# Patient Record
Sex: MALE | Race: WHITE | NOT HISPANIC OR LATINO | URBAN - METROPOLITAN AREA
[De-identification: names, ages, dates, MRNs, and addresses within clinical notes are randomized per-mention and may not be internally consistent; named-entity substitution may affect disease eponyms.]

---

## 2017-01-03 ENCOUNTER — COMMUNICATION - HEALTHEAST (OUTPATIENT)
Dept: FAMILY MEDICINE | Facility: CLINIC | Age: 77
End: 2017-01-03

## 2017-01-05 ENCOUNTER — OFFICE VISIT - HEALTHEAST (OUTPATIENT)
Dept: FAMILY MEDICINE | Facility: CLINIC | Age: 77
End: 2017-01-05

## 2017-01-05 DIAGNOSIS — G47.00 INSOMNIA, UNSPECIFIED: ICD-10-CM

## 2017-01-05 DIAGNOSIS — I48.0 PAROXYSMAL ATRIAL FIBRILLATION (H): ICD-10-CM

## 2017-01-05 DIAGNOSIS — H10.9 BILATERAL CONJUNCTIVITIS: ICD-10-CM

## 2017-01-09 ENCOUNTER — COMMUNICATION - HEALTHEAST (OUTPATIENT)
Dept: CARDIOLOGY | Facility: CLINIC | Age: 77
End: 2017-01-09

## 2017-01-09 DIAGNOSIS — I48.19 PERSISTENT ATRIAL FIBRILLATION (H): ICD-10-CM

## 2017-01-09 DIAGNOSIS — I48.91 A-FIB (H): ICD-10-CM

## 2017-01-09 DIAGNOSIS — I42.9 CARDIOMYOPATHY (H): ICD-10-CM

## 2017-02-16 ENCOUNTER — COMMUNICATION - HEALTHEAST (OUTPATIENT)
Dept: FAMILY MEDICINE | Facility: CLINIC | Age: 77
End: 2017-02-16

## 2017-02-21 ENCOUNTER — COMMUNICATION - HEALTHEAST (OUTPATIENT)
Dept: ADMINISTRATIVE | Facility: CLINIC | Age: 77
End: 2017-02-21

## 2017-03-22 ENCOUNTER — AMBULATORY - HEALTHEAST (OUTPATIENT)
Dept: CARDIOLOGY | Facility: CLINIC | Age: 77
End: 2017-03-22

## 2017-03-22 ENCOUNTER — COMMUNICATION - HEALTHEAST (OUTPATIENT)
Dept: CARDIOLOGY | Facility: CLINIC | Age: 77
End: 2017-03-22

## 2017-03-22 DIAGNOSIS — Z95.810 ICD (IMPLANTABLE CARDIOVERTER-DEFIBRILLATOR) IN PLACE: ICD-10-CM

## 2017-04-12 ENCOUNTER — AMBULATORY - HEALTHEAST (OUTPATIENT)
Dept: CARDIOLOGY | Facility: CLINIC | Age: 77
End: 2017-04-12

## 2017-04-12 DIAGNOSIS — I48.0 PAROXYSMAL ATRIAL FIBRILLATION (H): ICD-10-CM

## 2017-04-12 DIAGNOSIS — Z95.810 PRESENCE OF CARDIAC RESYNCHRONIZATION THERAPY DEFIBRILLATOR: ICD-10-CM

## 2017-04-12 DIAGNOSIS — I42.8 NONISCHEMIC CARDIOMYOPATHY (H): ICD-10-CM

## 2017-04-12 ASSESSMENT — MIFFLIN-ST. JEOR: SCORE: 1657.68

## 2017-04-26 ENCOUNTER — COMMUNICATION - HEALTHEAST (OUTPATIENT)
Dept: FAMILY MEDICINE | Facility: CLINIC | Age: 77
End: 2017-04-26

## 2017-04-26 DIAGNOSIS — E03.9 HYPOTHYROID: ICD-10-CM

## 2017-05-15 ENCOUNTER — COMMUNICATION - HEALTHEAST (OUTPATIENT)
Dept: FAMILY MEDICINE | Facility: CLINIC | Age: 77
End: 2017-05-15

## 2017-05-24 ENCOUNTER — AMBULATORY - HEALTHEAST (OUTPATIENT)
Dept: CARDIOLOGY | Facility: CLINIC | Age: 77
End: 2017-05-24

## 2017-05-24 DIAGNOSIS — Z95.810 ICD (IMPLANTABLE CARDIOVERTER-DEFIBRILLATOR) IN PLACE: ICD-10-CM

## 2017-05-25 ENCOUNTER — COMMUNICATION - HEALTHEAST (OUTPATIENT)
Dept: CARDIOLOGY | Facility: CLINIC | Age: 77
End: 2017-05-25

## 2017-05-25 DIAGNOSIS — I48.91 ATRIAL FIBRILLATION (H): ICD-10-CM

## 2017-05-25 DIAGNOSIS — I48.20 CHRONIC ATRIAL FIBRILLATION (H): ICD-10-CM

## 2017-05-25 LAB — HCC DEVICE COMMENTS: NORMAL

## 2017-05-30 ENCOUNTER — AMBULATORY - HEALTHEAST (OUTPATIENT)
Dept: CARDIOLOGY | Facility: CLINIC | Age: 77
End: 2017-05-30

## 2017-05-30 DIAGNOSIS — Z95.810 ICD (IMPLANTABLE CARDIOVERTER-DEFIBRILLATOR) IN PLACE: ICD-10-CM

## 2017-05-30 LAB — HCC DEVICE COMMENTS: NORMAL

## 2017-07-07 ENCOUNTER — OFFICE VISIT - HEALTHEAST (OUTPATIENT)
Dept: FAMILY MEDICINE | Facility: CLINIC | Age: 77
End: 2017-07-07

## 2017-07-07 DIAGNOSIS — I48.0 PAROXYSMAL ATRIAL FIBRILLATION (H): ICD-10-CM

## 2017-07-07 DIAGNOSIS — L30.9 DERMATITIS: ICD-10-CM

## 2017-07-07 DIAGNOSIS — D64.9 NORMOCHROMIC NORMOCYTIC ANEMIA: ICD-10-CM

## 2017-07-07 DIAGNOSIS — I42.8 NONISCHEMIC CARDIOMYOPATHY (H): ICD-10-CM

## 2017-07-07 DIAGNOSIS — E03.9 HYPOTHYROIDISM, UNSPECIFIED TYPE: ICD-10-CM

## 2017-07-07 DIAGNOSIS — N18.3 CHRONIC KIDNEY DISEASE (CKD), STAGE 3 (MODERATE): ICD-10-CM

## 2017-07-07 LAB
CHOLEST SERPL-MCNC: 221 MG/DL
FASTING STATUS PATIENT QL REPORTED: YES
HDLC SERPL-MCNC: 51 MG/DL
LDLC SERPL CALC-MCNC: 141 MG/DL
TRIGL SERPL-MCNC: 147 MG/DL

## 2017-07-12 ENCOUNTER — AMBULATORY - HEALTHEAST (OUTPATIENT)
Dept: CARDIOLOGY | Facility: CLINIC | Age: 77
End: 2017-07-12

## 2017-07-12 DIAGNOSIS — Z95.810 ICD (IMPLANTABLE CARDIOVERTER-DEFIBRILLATOR) IN PLACE: ICD-10-CM

## 2017-07-12 LAB — HCC DEVICE COMMENTS: NORMAL

## 2017-08-11 ENCOUNTER — COMMUNICATION - HEALTHEAST (OUTPATIENT)
Dept: FAMILY MEDICINE | Facility: CLINIC | Age: 77
End: 2017-08-11

## 2017-09-07 ENCOUNTER — COMMUNICATION - HEALTHEAST (OUTPATIENT)
Dept: CARDIOLOGY | Facility: CLINIC | Age: 77
End: 2017-09-07

## 2017-09-07 DIAGNOSIS — I10 HYPERTENSION: ICD-10-CM

## 2017-09-08 ENCOUNTER — COMMUNICATION - HEALTHEAST (OUTPATIENT)
Dept: FAMILY MEDICINE | Facility: CLINIC | Age: 77
End: 2017-09-08

## 2017-09-08 ENCOUNTER — AMBULATORY - HEALTHEAST (OUTPATIENT)
Dept: FAMILY MEDICINE | Facility: CLINIC | Age: 77
End: 2017-09-08

## 2017-09-08 DIAGNOSIS — H10.9 BILATERAL CONJUNCTIVITIS: ICD-10-CM

## 2017-10-17 ENCOUNTER — COMMUNICATION - HEALTHEAST (OUTPATIENT)
Dept: FAMILY MEDICINE | Facility: CLINIC | Age: 77
End: 2017-10-17

## 2017-10-17 DIAGNOSIS — E03.9 HYPOTHYROID: ICD-10-CM

## 2017-10-18 ENCOUNTER — AMBULATORY - HEALTHEAST (OUTPATIENT)
Dept: CARDIOLOGY | Facility: CLINIC | Age: 77
End: 2017-10-18

## 2017-10-18 DIAGNOSIS — Z95.810 ICD (IMPLANTABLE CARDIOVERTER-DEFIBRILLATOR) IN PLACE: ICD-10-CM

## 2017-10-18 LAB — HCC DEVICE COMMENTS: NORMAL

## 2017-11-09 ENCOUNTER — COMMUNICATION - HEALTHEAST (OUTPATIENT)
Dept: FAMILY MEDICINE | Facility: CLINIC | Age: 77
End: 2017-11-09

## 2018-01-04 ENCOUNTER — COMMUNICATION - HEALTHEAST (OUTPATIENT)
Dept: CARDIOLOGY | Facility: CLINIC | Age: 78
End: 2018-01-04

## 2018-01-04 DIAGNOSIS — I48.91 ATRIAL FIBRILLATION, UNSPECIFIED TYPE (H): ICD-10-CM

## 2018-01-04 DIAGNOSIS — I48.91 A-FIB (H): ICD-10-CM

## 2018-01-24 ENCOUNTER — COMMUNICATION - HEALTHEAST (OUTPATIENT)
Dept: CARDIOLOGY | Facility: CLINIC | Age: 78
End: 2018-01-24

## 2018-01-24 DIAGNOSIS — I42.9 CARDIOMYOPATHY (H): ICD-10-CM

## 2018-01-24 DIAGNOSIS — I48.19 PERSISTENT ATRIAL FIBRILLATION (H): ICD-10-CM

## 2018-01-29 ENCOUNTER — AMBULATORY - HEALTHEAST (OUTPATIENT)
Dept: CARDIOLOGY | Facility: CLINIC | Age: 78
End: 2018-01-29

## 2018-01-29 DIAGNOSIS — Z95.810 ICD (IMPLANTABLE CARDIOVERTER-DEFIBRILLATOR) IN PLACE: ICD-10-CM

## 2018-01-30 LAB — HCC DEVICE COMMENTS: NORMAL

## 2018-02-07 ENCOUNTER — COMMUNICATION - HEALTHEAST (OUTPATIENT)
Dept: FAMILY MEDICINE | Facility: CLINIC | Age: 78
End: 2018-02-07

## 2018-02-19 ENCOUNTER — COMMUNICATION - HEALTHEAST (OUTPATIENT)
Dept: CARDIOLOGY | Facility: CLINIC | Age: 78
End: 2018-02-19

## 2018-02-19 DIAGNOSIS — I48.20 CHRONIC ATRIAL FIBRILLATION (H): ICD-10-CM

## 2018-02-24 ENCOUNTER — COMMUNICATION - HEALTHEAST (OUTPATIENT)
Dept: CARDIOLOGY | Facility: CLINIC | Age: 78
End: 2018-02-24

## 2018-02-24 DIAGNOSIS — I42.9 CARDIOMYOPATHY (H): ICD-10-CM

## 2018-02-24 DIAGNOSIS — I48.19 PERSISTENT ATRIAL FIBRILLATION (H): ICD-10-CM

## 2018-03-09 ENCOUNTER — COMMUNICATION - HEALTHEAST (OUTPATIENT)
Dept: FAMILY MEDICINE | Facility: CLINIC | Age: 78
End: 2018-03-09

## 2018-03-09 DIAGNOSIS — E03.9 HYPOTHYROIDISM: ICD-10-CM

## 2018-03-12 ENCOUNTER — AMBULATORY - HEALTHEAST (OUTPATIENT)
Dept: LAB | Facility: CLINIC | Age: 78
End: 2018-03-12

## 2018-03-12 DIAGNOSIS — E03.9 HYPOTHYROIDISM: ICD-10-CM

## 2018-03-12 LAB — TSH SERPL DL<=0.005 MIU/L-ACNC: 4.44 UIU/ML (ref 0.3–5)

## 2018-04-09 ENCOUNTER — AMBULATORY - HEALTHEAST (OUTPATIENT)
Dept: CARDIOLOGY | Facility: CLINIC | Age: 78
End: 2018-04-09

## 2018-04-09 DIAGNOSIS — Z95.810 PRESENCE OF CARDIAC RESYNCHRONIZATION THERAPY DEFIBRILLATOR: ICD-10-CM

## 2018-04-09 DIAGNOSIS — I48.19 PERSISTENT ATRIAL FIBRILLATION (H): ICD-10-CM

## 2018-04-09 DIAGNOSIS — I48.0 PAROXYSMAL ATRIAL FIBRILLATION (H): ICD-10-CM

## 2018-04-09 DIAGNOSIS — I42.9 CARDIOMYOPATHY, UNSPECIFIED TYPE (H): ICD-10-CM

## 2018-04-09 LAB
ATRIAL RATE - MUSE: 101 BPM
DIASTOLIC BLOOD PRESSURE - MUSE: NORMAL MMHG
HCC DEVICE COMMENTS: NORMAL
INTERPRETATION ECG - MUSE: NORMAL
P AXIS - MUSE: NORMAL DEGREES
PR INTERVAL - MUSE: NORMAL MS
QRS DURATION - MUSE: 172 MS
QT - MUSE: 448 MS
QTC - MUSE: 516 MS
R AXIS - MUSE: 233 DEGREES
SYSTOLIC BLOOD PRESSURE - MUSE: NORMAL MMHG
T AXIS - MUSE: 46 DEGREES
VENTRICULAR RATE- MUSE: 80 BPM

## 2018-04-09 ASSESSMENT — MIFFLIN-ST. JEOR: SCORE: 1656.32

## 2018-04-14 ENCOUNTER — COMMUNICATION - HEALTHEAST (OUTPATIENT)
Dept: CARDIOLOGY | Facility: CLINIC | Age: 78
End: 2018-04-14

## 2018-04-14 ENCOUNTER — AMBULATORY - HEALTHEAST (OUTPATIENT)
Dept: CARDIOLOGY | Facility: CLINIC | Age: 78
End: 2018-04-14

## 2018-05-05 ENCOUNTER — COMMUNICATION - HEALTHEAST (OUTPATIENT)
Dept: FAMILY MEDICINE | Facility: CLINIC | Age: 78
End: 2018-05-05

## 2018-06-05 ENCOUNTER — COMMUNICATION - HEALTHEAST (OUTPATIENT)
Dept: CARDIOLOGY | Facility: CLINIC | Age: 78
End: 2018-06-05

## 2018-06-05 DIAGNOSIS — I10 HYPERTENSION: ICD-10-CM

## 2018-07-03 ENCOUNTER — AMBULATORY - HEALTHEAST (OUTPATIENT)
Dept: CARDIOLOGY | Facility: CLINIC | Age: 78
End: 2018-07-03

## 2018-07-03 DIAGNOSIS — Z95.810 ICD (IMPLANTABLE CARDIOVERTER-DEFIBRILLATOR) IN PLACE: ICD-10-CM

## 2018-07-05 LAB
HCC DEVICE COMMENTS: NORMAL
HCC DEVICE IMPLANTING PROVIDER: NORMAL
HCC DEVICE MANUFACTURE: NORMAL
HCC DEVICE MODEL: NORMAL
HCC DEVICE SERIAL NUMBER: NORMAL
HCC DEVICE TYPE: NORMAL

## 2018-07-18 ENCOUNTER — COMMUNICATION - HEALTHEAST (OUTPATIENT)
Dept: FAMILY MEDICINE | Facility: CLINIC | Age: 78
End: 2018-07-18

## 2018-07-18 DIAGNOSIS — E03.9 HYPOTHYROID: ICD-10-CM

## 2018-08-06 ENCOUNTER — COMMUNICATION - HEALTHEAST (OUTPATIENT)
Dept: FAMILY MEDICINE | Facility: CLINIC | Age: 78
End: 2018-08-06

## 2018-08-06 DIAGNOSIS — G47.00 INSOMNIA: ICD-10-CM

## 2018-08-15 ENCOUNTER — COMMUNICATION - HEALTHEAST (OUTPATIENT)
Dept: SCHEDULING | Facility: CLINIC | Age: 78
End: 2018-08-15

## 2018-09-26 ENCOUNTER — COMMUNICATION - HEALTHEAST (OUTPATIENT)
Dept: FAMILY MEDICINE | Facility: CLINIC | Age: 78
End: 2018-09-26

## 2018-09-27 ENCOUNTER — COMMUNICATION - HEALTHEAST (OUTPATIENT)
Dept: SCHEDULING | Facility: CLINIC | Age: 78
End: 2018-09-27

## 2018-09-27 ENCOUNTER — PATIENT OUTREACH (OUTPATIENT)
Dept: CARE COORDINATION | Facility: CLINIC | Age: 78
End: 2018-09-27

## 2018-09-27 ASSESSMENT — ACTIVITIES OF DAILY LIVING (ADL): DEPENDENT_IADLS:: INDEPENDENT

## 2018-09-27 NOTE — PROGRESS NOTES
"Clinic Care Coordination Contact    Clinic Care Coordination Contact  OUTREACH    Referral Information:  Referral Source: ED Follow-Up    Primary Diagnosis: (neuropathy)    Chief Complaint   Patient presents with     Clinic Care Coordination - Post Hospital     DC\"d on 9/26 from [facility]        Universal Utilization: Appropriate utilization.  Clinic Utilization  Difficulty keeping appointments:: No  Compliance Concerns: No  No-Show Concerns: No  No PCP office visit in Past Year: No            Clinical Concerns:  Current Medical Concerns:  Glad he went to ED yesterday to get a CAT scan to make sure he wasn't having a blood clot due to his A Fib. He just finished playing a round of golf.   Patient will f/u with PCP to review symptoms and discuss treatment for neuropathy.  Feeling well today.   Current Behavioral Concerns: no concerns noted.    Education Provided to patient: Reviewed care coordination role.     Health Maintenance Reviewed: Due/Overdue     Medication Management:  Is independent with his medications and has no concerns.     Functional Status:  Dependent ADLs:: Independent, Ambulation-no assistive device  Dependent IADLs:: Independent  Bed or wheelchair confined:: No  Mobility Status: Independent  Fallen 2 or more times in the past year?: No  Any fall with injury in the past year?: No    Living Situation:  Current living arrangement:: I live alone, I live in a private home  Type of residence:: Private home - stairs    Diet/Exercise/Sleep:  Diet:: Regular  Inadequate nutrition (GOAL):: No  Food Insecurity: No  Tube Feeding: No  Exercise:: Yes  Inadequate activity/exercise (GOAL):: No  Significant changes in sleep pattern (GOAL): No    Transportation:  Transportation concerns (GOAL):: No  Transportation means:: Regular car     Psychosocial:  Adventism or spiritual beliefs that impact treatment:: No  Mental health DX:: No  Mental health management concern (GOAL):: No  Informal Support system:: " Friends     Financial/Insurance:   Financial/Insurance concerns (GOAL):: No  [employer] insurance.  Works part time.      Resources and Interventions:  Current Resources:      Community Resources: None  Supplies used at home:: None  Equipment Currently Used at Home: none    Advance Care Plan/Directive  Advanced Care Plans/Directives on file:: No  Type Advanced Care Plans/Directives: Advanced Directive - has one partially done, but has not finished it.  Been working on it for two years. Offered to send him the shortened form for HCD and he is interested in completing that.   Advanced Care Plan/Directive Status: In Process    Referrals Placed: None    Patient/Caregiver understanding: Patient does not want to engage with care coordinator at this time. He will set up OV with PCP. CC will send letter with CC contact information and a HCD.      Plan: No further outreach by this CC at this time.    Ernestina Villafuerte,   [facility]  [email or web address]  [phone]

## 2018-09-27 NOTE — LETTER
[place] CARE COORDINATION  [facility] [address]  [facility] [address]    September 28, 2018    Glenn Caballero        Dear Glenn,    I am a clinic care coordinator who works with Collin Blanco at [phone]. I wanted to thank you for spending the time to talk with me.  I wanted to introduce myself and provide you with my contact information so that you can call me with questions or concerns about your health care. Below is a description of clinic care coordination and how I can further assist you.     The clinic care coordinator is a registered nurse and/or  who understand the health care system. The goal of clinic care coordination is to help you manage your health and improve access to the [facility] system in the most efficient manner. The registered nurse can assist you in meeting your health care goals by providing education, coordinating services, and strengthening the communication among your providers. The  can assist you with financial, behavioral, psychosocial, chemical dependency, counseling, and/or psychiatric resources.    Please feel free to contact me at [phone], with any questions or concerns. We at [facility] are focused on providing you with the highest-quality healthcare experience possible and that all starts with you.     Sincerely,     Ernestina Villafuerte    Enclosed:health care directive

## 2018-11-03 ENCOUNTER — COMMUNICATION - HEALTHEAST (OUTPATIENT)
Dept: FAMILY MEDICINE | Facility: CLINIC | Age: 78
End: 2018-11-03

## 2018-11-03 DIAGNOSIS — G47.00 INSOMNIA: ICD-10-CM

## 2018-11-07 ENCOUNTER — OFFICE VISIT - HEALTHEAST (OUTPATIENT)
Dept: FAMILY MEDICINE | Facility: CLINIC | Age: 78
End: 2018-11-07

## 2018-11-07 DIAGNOSIS — E03.9 HYPOTHYROIDISM, UNSPECIFIED TYPE: ICD-10-CM

## 2018-11-07 DIAGNOSIS — R20.2 PARESTHESIAS: ICD-10-CM

## 2018-11-07 DIAGNOSIS — D64.9 NORMOCHROMIC NORMOCYTIC ANEMIA: ICD-10-CM

## 2018-11-07 DIAGNOSIS — Z80.0 FAMILY HISTORY OF COLON CANCER: ICD-10-CM

## 2018-11-07 DIAGNOSIS — I42.8 NONISCHEMIC CARDIOMYOPATHY (H): ICD-10-CM

## 2018-11-07 LAB
ALBUMIN SERPL-MCNC: 3.8 G/DL (ref 3.5–5)
ALP SERPL-CCNC: 74 U/L (ref 45–120)
ALT SERPL W P-5'-P-CCNC: 22 U/L (ref 0–45)
ANION GAP SERPL CALCULATED.3IONS-SCNC: 10 MMOL/L (ref 5–18)
AST SERPL W P-5'-P-CCNC: 28 U/L (ref 0–40)
BILIRUB SERPL-MCNC: 0.6 MG/DL (ref 0–1)
BUN SERPL-MCNC: 19 MG/DL (ref 8–28)
CALCIUM SERPL-MCNC: 9.6 MG/DL (ref 8.5–10.5)
CHLORIDE BLD-SCNC: 104 MMOL/L (ref 98–107)
CO2 SERPL-SCNC: 27 MMOL/L (ref 22–31)
CREAT SERPL-MCNC: 1.06 MG/DL (ref 0.7–1.3)
ERYTHROCYTE [DISTWIDTH] IN BLOOD BY AUTOMATED COUNT: 12 % (ref 11–14.5)
GFR SERPL CREATININE-BSD FRML MDRD: >60 ML/MIN/1.73M2
GLUCOSE BLD-MCNC: 114 MG/DL (ref 70–125)
HCT VFR BLD AUTO: 39.7 % (ref 40–54)
HGB BLD-MCNC: 13.5 G/DL (ref 14–18)
MCH RBC QN AUTO: 32.9 PG (ref 27–34)
MCHC RBC AUTO-ENTMCNC: 34 G/DL (ref 32–36)
MCV RBC AUTO: 97 FL (ref 80–100)
PLATELET # BLD AUTO: 187 THOU/UL (ref 140–440)
PMV BLD AUTO: 7.4 FL (ref 7–10)
POTASSIUM BLD-SCNC: 4.3 MMOL/L (ref 3.5–5)
PROT SERPL-MCNC: 7 G/DL (ref 6–8)
RBC # BLD AUTO: 4.1 MILL/UL (ref 4.4–6.2)
SODIUM SERPL-SCNC: 141 MMOL/L (ref 136–145)
TSH SERPL DL<=0.005 MIU/L-ACNC: 1.92 UIU/ML (ref 0.3–5)
VIT B12 SERPL-MCNC: 472 PG/ML (ref 213–816)
WBC: 4.6 THOU/UL (ref 4–11)

## 2018-11-08 ENCOUNTER — HOSPITAL ENCOUNTER (OUTPATIENT)
Dept: CARDIOLOGY | Facility: HOSPITAL | Age: 78
Discharge: HOME | End: 2018-11-08
Attending: INTERNAL MEDICINE

## 2018-11-08 DIAGNOSIS — I48.0 PAROXYSMAL ATRIAL FIBRILLATION (H): ICD-10-CM

## 2018-11-08 DIAGNOSIS — I42.9 CARDIOMYOPATHY, UNSPECIFIED TYPE (H): ICD-10-CM

## 2018-11-08 DIAGNOSIS — I48.19 PERSISTENT ATRIAL FIBRILLATION (H): ICD-10-CM

## 2018-11-08 DIAGNOSIS — Z95.810 PRESENCE OF CARDIAC RESYNCHRONIZATION THERAPY DEFIBRILLATOR: ICD-10-CM

## 2018-11-08 LAB
AORTIC ROOT: 3.1 CM
AORTIC VALVE MEAN VELOCITY: 111 CM/S
AV DIMENSIONLESS INDEX VTI: 0.6
AV MEAN GRADIENT: 5 MMHG
AV PEAK GRADIENT: 7.6 MMHG
AV VALVE AREA: 2.2 CM2
AV VELOCITY RATIO: 0.6
BSA FOR ECHO PROCEDURE: 2.12 M2
CV BLOOD PRESSURE: NORMAL MMHG
CV ECHO HEIGHT: 73 IN
CV ECHO WEIGHT: 192 LBS
DOP CALC AO PEAK VEL: 138 CM/S
DOP CALC AO VTI: 31 CM
DOP CALC LVOT AREA: 3.8 CM2
DOP CALC LVOT DIAMETER: 2.2 CM
DOP CALC LVOT PEAK VEL: 86 CM/S
DOP CALC LVOT STROKE VOLUME: 68.4 CM3
DOP CALCLVOT PEAK VEL VTI: 18 CM
EJECTION FRACTION: 40 % (ref 55–75)
FRACTIONAL SHORTENING: 14.5 % (ref 28–44)
INTERVENTRICULAR SEPTUM IN END DIASTOLE: 1.2 CM (ref 0.6–1)
IVS/PW RATIO: 1
LA AREA 1: 29.4 CM2
LA AREA 2: 29 CM2
LEFT ATRIUM LENGTH: 6 CM
LEFT ATRIUM SIZE: 5.1 CM
LEFT ATRIUM VOLUME INDEX: 57 ML/M2
LEFT ATRIUM VOLUME: 120.8 ML
LEFT VENTRICLE CARDIAC INDEX: 2.8 L/MIN/M2
LEFT VENTRICLE CARDIAC OUTPUT: 5.9 L/MIN
LEFT VENTRICLE DIASTOLIC VOLUME INDEX: 68.9 CM3/M2 (ref 34–74)
LEFT VENTRICLE DIASTOLIC VOLUME: 146 CM3 (ref 62–150)
LEFT VENTRICLE HEART RATE: 86 BPM
LEFT VENTRICLE MASS INDEX: 128.5 G/M2
LEFT VENTRICLE SYSTOLIC VOLUME INDEX: 41 CM3/M2 (ref 11–31)
LEFT VENTRICLE SYSTOLIC VOLUME: 87 CM3 (ref 21–61)
LEFT VENTRICULAR INTERNAL DIMENSION IN DIASTOLE: 5.5 CM (ref 4.2–5.8)
LEFT VENTRICULAR INTERNAL DIMENSION IN SYSTOLE: 4.7 CM (ref 2.5–4)
LEFT VENTRICULAR MASS: 272.4 G
LEFT VENTRICULAR OUTFLOW TRACT MEAN GRADIENT: 2 MMHG
LEFT VENTRICULAR OUTFLOW TRACT MEAN VELOCITY: 69.4 CM/S
LEFT VENTRICULAR POSTERIOR WALL IN END DIASTOLE: 1.2 CM (ref 0.6–1)
LV STROKE VOLUME INDEX: 32.3 ML/M2
MITRAL REGURGITANT VELOCITY TIME INTEGRAL: 131 CM
MR FLOW: 12 CM3
MR MEAN GRADIENT: 52 MMHG
MR MEAN VELOCITY: 398 CM/S
MR PEAK GRADIENT: 81.7 MMHG
MR PISA EROA: 0.1 CM2
MR PISA RADIUS: 0.4 CM
MR PISA VN NYQUIST: 34.9 CM/S
MV AVERAGE E/E' RATIO: 24.3 CM/S
MV DECELERATION TIME: 215 MS
MV E'TISSUE VEL-LAT: 4 CM/S
MV E'TISSUE VEL-MED: 5.46 CM/S
MV LATERAL E/E' RATIO: 28.8
MV MEDIAL E/E' RATIO: 21.1
MV PEAK E VELOCITY: 115 CM/S
MV REGURGITANT VOLUME: 10.2 CC
NUC REST DIASTOLIC VOLUME INDEX: 3072 LBS
NUC REST SYSTOLIC VOLUME INDEX: 73 IN
PISA MR PEAK VEL: 452 CM/S
TRICUSPID REGURGITATION PEAK PRESSURE GRADIENT: 20.8 MMHG
TRICUSPID VALVE ANULAR PLANE SYSTOLIC EXCURSION: 1.5 CM
TRICUSPID VALVE PEAK REGURGITANT VELOCITY: 228 CM/S

## 2018-11-08 ASSESSMENT — MIFFLIN-ST. JEOR: SCORE: 1634.79

## 2018-11-11 ENCOUNTER — COMMUNICATION - HEALTHEAST (OUTPATIENT)
Dept: FAMILY MEDICINE | Facility: CLINIC | Age: 78
End: 2018-11-11

## 2018-11-15 ENCOUNTER — AMBULATORY - HEALTHEAST (OUTPATIENT)
Dept: CARDIOLOGY | Facility: CLINIC | Age: 78
End: 2018-11-15

## 2018-11-15 DIAGNOSIS — I42.8 NONISCHEMIC CARDIOMYOPATHY (H): ICD-10-CM

## 2018-11-15 DIAGNOSIS — I48.20 CHRONIC ATRIAL FIBRILLATION (H): ICD-10-CM

## 2018-11-15 DIAGNOSIS — Z95.810 PRESENCE OF CARDIAC RESYNCHRONIZATION THERAPY DEFIBRILLATOR: ICD-10-CM

## 2018-11-15 DIAGNOSIS — Z95.810 ICD (IMPLANTABLE CARDIOVERTER-DEFIBRILLATOR) IN PLACE: ICD-10-CM

## 2018-11-15 ASSESSMENT — MIFFLIN-ST. JEOR: SCORE: 1625.71

## 2018-11-27 ENCOUNTER — COMMUNICATION - HEALTHEAST (OUTPATIENT)
Dept: CARDIOLOGY | Facility: CLINIC | Age: 78
End: 2018-11-27

## 2018-11-27 DIAGNOSIS — I10 HYPERTENSION: ICD-10-CM

## 2019-01-08 ENCOUNTER — COMMUNICATION - HEALTHEAST (OUTPATIENT)
Dept: CARDIOLOGY | Facility: CLINIC | Age: 79
End: 2019-01-08

## 2019-01-08 DIAGNOSIS — I48.91 ATRIAL FIBRILLATION, UNSPECIFIED TYPE (H): ICD-10-CM

## 2019-01-19 ENCOUNTER — COMMUNICATION - HEALTHEAST (OUTPATIENT)
Dept: FAMILY MEDICINE | Facility: CLINIC | Age: 79
End: 2019-01-19

## 2019-01-19 DIAGNOSIS — E03.9 HYPOTHYROID: ICD-10-CM

## 2019-01-22 ENCOUNTER — AMBULATORY - HEALTHEAST (OUTPATIENT)
Dept: LAB | Facility: CLINIC | Age: 79
End: 2019-01-22

## 2019-01-22 ENCOUNTER — RECORDS - HEALTHEAST (OUTPATIENT)
Dept: ADMINISTRATIVE | Facility: OTHER | Age: 79
End: 2019-01-22

## 2019-01-22 DIAGNOSIS — R20.2 PARESTHESIAS: ICD-10-CM

## 2019-01-22 DIAGNOSIS — R20.2 LEFT LEG PARESTHESIAS: ICD-10-CM

## 2019-01-24 ENCOUNTER — AMBULATORY - HEALTHEAST (OUTPATIENT)
Dept: LAB | Facility: CLINIC | Age: 79
End: 2019-01-24

## 2019-01-24 DIAGNOSIS — R20.2 PARESTHESIAS: ICD-10-CM

## 2019-01-24 LAB
CK SERPL-CCNC: 180 U/L (ref 30–190)
ERYTHROCYTE [SEDIMENTATION RATE] IN BLOOD BY WESTERGREN METHOD: 5 MM/HR (ref 0–15)

## 2019-01-25 LAB — B BURGDOR IGG+IGM SER QL: 0.04 INDEX VALUE

## 2019-01-28 LAB
ALBUMIN PERCENT: 62.3 % (ref 51–67)
ALBUMIN SERPL ELPH-MCNC: 4.5 G/DL (ref 3.2–4.7)
ALPHA 1 PERCENT: 2.2 % (ref 2–4)
ALPHA 2 PERCENT: 9.5 % (ref 5–13)
ALPHA1 GLOB SERPL ELPH-MCNC: 0.2 G/DL (ref 0.1–0.3)
ALPHA2 GLOB SERPL ELPH-MCNC: 0.7 G/DL (ref 0.4–0.9)
ANA SER QL: 0.4 U
B-GLOBULIN SERPL ELPH-MCNC: 0.8 G/DL (ref 0.7–1.2)
BETA PERCENT: 11.4 % (ref 10–17)
GAMMA GLOB SERPL ELPH-MCNC: 1.1 G/DL (ref 0.6–1.4)
GAMMA GLOBULIN PERCENT: 14.6 % (ref 9–20)
PATH ICD:: NORMAL
PROT PATTERN SERPL ELPH-IMP: NORMAL
PROT SERPL-MCNC: 7.2 G/DL (ref 6–8)
REVIEWING PATHOLOGIST: NORMAL

## 2019-01-31 ENCOUNTER — COMMUNICATION - HEALTHEAST (OUTPATIENT)
Dept: FAMILY MEDICINE | Facility: CLINIC | Age: 79
End: 2019-01-31

## 2019-01-31 DIAGNOSIS — G47.00 INSOMNIA: ICD-10-CM

## 2019-02-07 ENCOUNTER — AMBULATORY - HEALTHEAST (OUTPATIENT)
Dept: CARDIOLOGY | Facility: CLINIC | Age: 79
End: 2019-02-07

## 2019-02-07 DIAGNOSIS — Z95.810 PRESENCE OF CARDIAC RESYNCHRONIZATION THERAPY DEFIBRILLATOR: ICD-10-CM

## 2019-02-19 ENCOUNTER — RECORDS - HEALTHEAST (OUTPATIENT)
Dept: ADMINISTRATIVE | Facility: OTHER | Age: 79
End: 2019-02-19

## 2019-02-26 ENCOUNTER — RECORDS - HEALTHEAST (OUTPATIENT)
Dept: ADMINISTRATIVE | Facility: OTHER | Age: 79
End: 2019-02-26

## 2019-02-28 ENCOUNTER — COMMUNICATION - HEALTHEAST (OUTPATIENT)
Dept: CARDIOLOGY | Facility: CLINIC | Age: 79
End: 2019-02-28

## 2019-02-28 DIAGNOSIS — I48.20 CHRONIC ATRIAL FIBRILLATION (H): ICD-10-CM

## 2019-03-01 ENCOUNTER — COMMUNICATION - HEALTHEAST (OUTPATIENT)
Dept: FAMILY MEDICINE | Facility: CLINIC | Age: 79
End: 2019-03-01

## 2019-03-01 DIAGNOSIS — H10.9 CONJUNCTIVITIS OF BOTH EYES, UNSPECIFIED CONJUNCTIVITIS TYPE: ICD-10-CM

## 2019-03-08 ENCOUNTER — COMMUNICATION - HEALTHEAST (OUTPATIENT)
Dept: FAMILY MEDICINE | Facility: CLINIC | Age: 79
End: 2019-03-08

## 2019-03-08 DIAGNOSIS — H10.9 BILATERAL CONJUNCTIVITIS: ICD-10-CM

## 2019-04-17 ENCOUNTER — COMMUNICATION - HEALTHEAST (OUTPATIENT)
Dept: FAMILY MEDICINE | Facility: CLINIC | Age: 79
End: 2019-04-17

## 2019-04-17 DIAGNOSIS — E03.9 HYPOTHYROID: ICD-10-CM

## 2019-05-04 ENCOUNTER — COMMUNICATION - HEALTHEAST (OUTPATIENT)
Dept: FAMILY MEDICINE | Facility: CLINIC | Age: 79
End: 2019-05-04

## 2019-05-04 ENCOUNTER — COMMUNICATION - HEALTHEAST (OUTPATIENT)
Dept: CARDIOLOGY | Facility: CLINIC | Age: 79
End: 2019-05-04

## 2019-05-04 DIAGNOSIS — I48.0 PAROXYSMAL ATRIAL FIBRILLATION (H): ICD-10-CM

## 2019-05-04 DIAGNOSIS — G47.00 INSOMNIA: ICD-10-CM

## 2019-05-04 DIAGNOSIS — Z95.810 PRESENCE OF CARDIAC RESYNCHRONIZATION THERAPY DEFIBRILLATOR: ICD-10-CM

## 2019-05-04 DIAGNOSIS — I42.9 CARDIOMYOPATHY, UNSPECIFIED TYPE (H): ICD-10-CM

## 2019-05-04 DIAGNOSIS — I48.19 PERSISTENT ATRIAL FIBRILLATION (H): ICD-10-CM

## 2019-05-13 ENCOUNTER — AMBULATORY - HEALTHEAST (OUTPATIENT)
Dept: CARDIOLOGY | Facility: CLINIC | Age: 79
End: 2019-05-13

## 2019-05-13 DIAGNOSIS — Z95.810 ICD (IMPLANTABLE CARDIOVERTER-DEFIBRILLATOR) IN PLACE: ICD-10-CM

## 2019-06-24 ENCOUNTER — AMBULATORY - HEALTHEAST (OUTPATIENT)
Dept: CARDIOLOGY | Facility: CLINIC | Age: 79
End: 2019-06-24

## 2019-06-24 ENCOUNTER — COMMUNICATION - HEALTHEAST (OUTPATIENT)
Dept: CARDIOLOGY | Facility: CLINIC | Age: 79
End: 2019-06-24

## 2019-06-24 DIAGNOSIS — Z95.810 PRESENCE OF CARDIAC RESYNCHRONIZATION THERAPY DEFIBRILLATOR: ICD-10-CM

## 2019-06-25 ENCOUNTER — RECORDS - HEALTHEAST (OUTPATIENT)
Dept: CARDIOLOGY | Facility: CLINIC | Age: 79
End: 2019-06-25

## 2019-06-26 ENCOUNTER — OFFICE VISIT - HEALTHEAST (OUTPATIENT)
Dept: FAMILY MEDICINE | Facility: CLINIC | Age: 79
End: 2019-06-26

## 2019-06-26 DIAGNOSIS — E03.9 HYPOTHYROIDISM, UNSPECIFIED TYPE: ICD-10-CM

## 2019-06-26 DIAGNOSIS — I48.20 CHRONIC ATRIAL FIBRILLATION (H): ICD-10-CM

## 2019-06-26 DIAGNOSIS — R53.83 FATIGUE, UNSPECIFIED TYPE: ICD-10-CM

## 2019-06-26 DIAGNOSIS — R60.0 BILATERAL LEG EDEMA: ICD-10-CM

## 2019-06-26 DIAGNOSIS — I42.8 NONISCHEMIC CARDIOMYOPATHY (H): ICD-10-CM

## 2019-06-26 DIAGNOSIS — R06.02 SOB (SHORTNESS OF BREATH): ICD-10-CM

## 2019-06-26 LAB
ALBUMIN SERPL-MCNC: 3.9 G/DL (ref 3.5–5)
ALBUMIN UR-MCNC: NEGATIVE MG/DL
ALP SERPL-CCNC: 63 U/L (ref 45–120)
ALT SERPL W P-5'-P-CCNC: 20 U/L (ref 0–45)
ANION GAP SERPL CALCULATED.3IONS-SCNC: 10 MMOL/L (ref 5–18)
APPEARANCE UR: ABNORMAL
AST SERPL W P-5'-P-CCNC: 27 U/L (ref 0–40)
BASOPHILS # BLD AUTO: 0 THOU/UL (ref 0–0.2)
BASOPHILS NFR BLD AUTO: 0 % (ref 0–2)
BILIRUB SERPL-MCNC: 0.7 MG/DL (ref 0–1)
BILIRUB UR QL STRIP: NEGATIVE
BNP SERPL-MCNC: 332 PG/ML (ref 0–82)
BUN SERPL-MCNC: 20 MG/DL (ref 8–28)
CALCIUM SERPL-MCNC: 9.7 MG/DL (ref 8.5–10.5)
CHLORIDE BLD-SCNC: 104 MMOL/L (ref 98–107)
CO2 SERPL-SCNC: 26 MMOL/L (ref 22–31)
COLOR UR AUTO: YELLOW
CREAT SERPL-MCNC: 1.19 MG/DL (ref 0.7–1.3)
EOSINOPHIL # BLD AUTO: 0.1 THOU/UL (ref 0–0.4)
EOSINOPHIL NFR BLD AUTO: 3 % (ref 0–6)
ERYTHROCYTE [DISTWIDTH] IN BLOOD BY AUTOMATED COUNT: 11.7 % (ref 11–14.5)
GFR SERPL CREATININE-BSD FRML MDRD: 59 ML/MIN/1.73M2
GLUCOSE BLD-MCNC: 88 MG/DL (ref 70–125)
GLUCOSE UR STRIP-MCNC: NEGATIVE MG/DL
HCT VFR BLD AUTO: 42.1 % (ref 40–54)
HGB BLD-MCNC: 13.8 G/DL (ref 14–18)
HGB UR QL STRIP: NEGATIVE
KETONES UR STRIP-MCNC: NEGATIVE MG/DL
LEUKOCYTE ESTERASE UR QL STRIP: NEGATIVE
LYMPHOCYTES # BLD AUTO: 1 THOU/UL (ref 0.8–4.4)
LYMPHOCYTES NFR BLD AUTO: 19 % (ref 20–40)
MCH RBC QN AUTO: 32.9 PG (ref 27–34)
MCHC RBC AUTO-ENTMCNC: 32.7 G/DL (ref 32–36)
MCV RBC AUTO: 101 FL (ref 80–100)
MONOCYTES # BLD AUTO: 0.3 THOU/UL (ref 0–0.9)
MONOCYTES NFR BLD AUTO: 6 % (ref 2–10)
NEUTROPHILS # BLD AUTO: 3.7 THOU/UL (ref 2–7.7)
NEUTROPHILS NFR BLD AUTO: 72 % (ref 50–70)
NITRATE UR QL: NEGATIVE
PH UR STRIP: 6 [PH] (ref 5–8)
PLATELET # BLD AUTO: 199 THOU/UL (ref 140–440)
PMV BLD AUTO: 7.7 FL (ref 7–10)
POTASSIUM BLD-SCNC: 4.6 MMOL/L (ref 3.5–5)
PROT SERPL-MCNC: 6.7 G/DL (ref 6–8)
RBC # BLD AUTO: 4.19 MILL/UL (ref 4.4–6.2)
SODIUM SERPL-SCNC: 140 MMOL/L (ref 136–145)
SP GR UR STRIP: 1.02 (ref 1–1.03)
TSH SERPL DL<=0.005 MIU/L-ACNC: 0.72 UIU/ML (ref 0.3–5)
UROBILINOGEN UR STRIP-ACNC: ABNORMAL
WBC: 5.2 THOU/UL (ref 4–11)

## 2019-06-26 ASSESSMENT — MIFFLIN-ST. JEOR: SCORE: 1638.42

## 2019-06-27 ENCOUNTER — COMMUNICATION - HEALTHEAST (OUTPATIENT)
Dept: FAMILY MEDICINE | Facility: CLINIC | Age: 79
End: 2019-06-27

## 2019-06-27 LAB
ATRIAL RATE - MUSE: 80 BPM
DIASTOLIC BLOOD PRESSURE - MUSE: NORMAL MMHG
INTERPRETATION ECG - MUSE: NORMAL
P AXIS - MUSE: NORMAL DEGREES
PR INTERVAL - MUSE: NORMAL MS
QRS DURATION - MUSE: 156 MS
QT - MUSE: 460 MS
QTC - MUSE: 496 MS
R AXIS - MUSE: 236 DEGREES
SYSTOLIC BLOOD PRESSURE - MUSE: NORMAL MMHG
T AXIS - MUSE: 40 DEGREES
VENTRICULAR RATE- MUSE: 70 BPM

## 2019-07-14 ENCOUNTER — COMMUNICATION - HEALTHEAST (OUTPATIENT)
Dept: FAMILY MEDICINE | Facility: CLINIC | Age: 79
End: 2019-07-14

## 2019-07-24 ENCOUNTER — OFFICE VISIT - HEALTHEAST (OUTPATIENT)
Dept: FAMILY MEDICINE | Facility: CLINIC | Age: 79
End: 2019-07-24

## 2019-07-24 DIAGNOSIS — G62.9 PERIPHERAL POLYNEUROPATHY: ICD-10-CM

## 2019-07-24 DIAGNOSIS — R53.83 FATIGUE, UNSPECIFIED TYPE: ICD-10-CM

## 2019-07-24 DIAGNOSIS — E55.9 VITAMIN D DEFICIENCY: ICD-10-CM

## 2019-07-24 LAB
C REACTIVE PROTEIN LHE: <0.1 MG/DL (ref 0–0.8)
CK SERPL-CCNC: 218 U/L (ref 30–190)
ERYTHROCYTE [SEDIMENTATION RATE] IN BLOOD BY WESTERGREN METHOD: 8 MM/HR (ref 0–15)
VIT B12 SERPL-MCNC: 524 PG/ML (ref 213–816)

## 2019-07-25 LAB
25(OH)D3 SERPL-MCNC: 27.4 NG/ML (ref 30–80)
25(OH)D3 SERPL-MCNC: 27.4 NG/ML (ref 30–80)

## 2019-07-29 ENCOUNTER — COMMUNICATION - HEALTHEAST (OUTPATIENT)
Dept: FAMILY MEDICINE | Facility: CLINIC | Age: 79
End: 2019-07-29

## 2019-07-29 DIAGNOSIS — G47.00 INSOMNIA: ICD-10-CM

## 2019-08-22 ENCOUNTER — RECORDS - HEALTHEAST (OUTPATIENT)
Dept: ADMINISTRATIVE | Facility: OTHER | Age: 79
End: 2019-08-22

## 2019-09-04 ENCOUNTER — AMBULATORY - HEALTHEAST (OUTPATIENT)
Dept: CARDIOLOGY | Facility: CLINIC | Age: 79
End: 2019-09-04

## 2019-09-04 DIAGNOSIS — Z95.810 PRESENCE OF CARDIAC RESYNCHRONIZATION THERAPY DEFIBRILLATOR: ICD-10-CM

## 2019-09-04 DIAGNOSIS — I48.20 CHRONIC ATRIAL FIBRILLATION (H): ICD-10-CM

## 2019-09-04 DIAGNOSIS — G62.9 PERIPHERAL POLYNEUROPATHY: ICD-10-CM

## 2019-09-04 DIAGNOSIS — I42.8 NONISCHEMIC CARDIOMYOPATHY (H): ICD-10-CM

## 2019-09-04 LAB
ATRIAL RATE - MUSE: 83 BPM
DIASTOLIC BLOOD PRESSURE - MUSE: NORMAL MMHG
HCC DEVICE COMMENTS: NORMAL
HCC DEVICE IMPLANTING PROVIDER: NORMAL
HCC DEVICE MANUFACTURE: NORMAL
HCC DEVICE MODEL: NORMAL
HCC DEVICE SERIAL NUMBER: NORMAL
HCC DEVICE TYPE: NORMAL
INTERPRETATION ECG - MUSE: NORMAL
P AXIS - MUSE: NORMAL DEGREES
PR INTERVAL - MUSE: NORMAL MS
QRS DURATION - MUSE: 164 MS
QT - MUSE: 436 MS
QTC - MUSE: 512 MS
R AXIS - MUSE: 201 DEGREES
SYSTOLIC BLOOD PRESSURE - MUSE: NORMAL MMHG
T AXIS - MUSE: 48 DEGREES
VENTRICULAR RATE- MUSE: 83 BPM

## 2019-09-19 ENCOUNTER — OFFICE VISIT - HEALTHEAST (OUTPATIENT)
Dept: FAMILY MEDICINE | Facility: CLINIC | Age: 79
End: 2019-09-19

## 2019-09-19 DIAGNOSIS — M62.838 MUSCLE SPASM: ICD-10-CM

## 2019-09-19 LAB
ALBUMIN SERPL-MCNC: 4.1 G/DL (ref 3.5–5)
ALP SERPL-CCNC: 68 U/L (ref 45–120)
ALT SERPL W P-5'-P-CCNC: 20 U/L (ref 0–45)
ANION GAP SERPL CALCULATED.3IONS-SCNC: 9 MMOL/L (ref 5–18)
AST SERPL W P-5'-P-CCNC: 27 U/L (ref 0–40)
BILIRUB SERPL-MCNC: 1.2 MG/DL (ref 0–1)
BUN SERPL-MCNC: 32 MG/DL (ref 8–28)
CALCIUM SERPL-MCNC: 9.6 MG/DL (ref 8.5–10.5)
CHLORIDE BLD-SCNC: 103 MMOL/L (ref 98–107)
CO2 SERPL-SCNC: 25 MMOL/L (ref 22–31)
CREAT SERPL-MCNC: 1.36 MG/DL (ref 0.7–1.3)
ERYTHROCYTE [DISTWIDTH] IN BLOOD BY AUTOMATED COUNT: 12.8 % (ref 11–14.5)
GFR SERPL CREATININE-BSD FRML MDRD: 51 ML/MIN/1.73M2
GLUCOSE BLD-MCNC: 101 MG/DL (ref 70–125)
HCT VFR BLD AUTO: 42.6 % (ref 40–54)
HGB BLD-MCNC: 14.3 G/DL (ref 14–18)
LIPASE SERPL-CCNC: 63 U/L (ref 0–52)
MCH RBC QN AUTO: 32.7 PG (ref 27–34)
MCHC RBC AUTO-ENTMCNC: 33.5 G/DL (ref 32–36)
MCV RBC AUTO: 98 FL (ref 80–100)
PLATELET # BLD AUTO: 215 THOU/UL (ref 140–440)
PMV BLD AUTO: 7.9 FL (ref 7–10)
POTASSIUM BLD-SCNC: 5.2 MMOL/L (ref 3.5–5)
PROT SERPL-MCNC: 7.2 G/DL (ref 6–8)
RBC # BLD AUTO: 4.36 MILL/UL (ref 4.4–6.2)
SODIUM SERPL-SCNC: 137 MMOL/L (ref 136–145)
WBC: 4.8 THOU/UL (ref 4–11)

## 2019-09-19 ASSESSMENT — MIFFLIN-ST. JEOR: SCORE: 1593.5

## 2019-10-05 ENCOUNTER — COMMUNICATION - HEALTHEAST (OUTPATIENT)
Dept: CARDIOLOGY | Facility: CLINIC | Age: 79
End: 2019-10-05

## 2019-10-05 DIAGNOSIS — I48.91 ATRIAL FIBRILLATION, UNSPECIFIED TYPE (H): ICD-10-CM

## 2019-10-19 ENCOUNTER — COMMUNICATION - HEALTHEAST (OUTPATIENT)
Dept: FAMILY MEDICINE | Facility: CLINIC | Age: 79
End: 2019-10-19

## 2019-10-19 DIAGNOSIS — E03.9 HYPOTHYROID: ICD-10-CM

## 2019-10-19 RX ORDER — LEVOTHYROXINE SODIUM 125 UG/1
125 TABLET ORAL EVERY MORNING
Qty: 90 TABLET | Refills: 3 | Status: SHIPPED | OUTPATIENT
Start: 2019-10-19

## 2019-10-23 ENCOUNTER — COMMUNICATION - HEALTHEAST (OUTPATIENT)
Dept: FAMILY MEDICINE | Facility: CLINIC | Age: 79
End: 2019-10-23

## 2019-10-23 DIAGNOSIS — G47.00 INSOMNIA: ICD-10-CM

## 2019-10-29 ENCOUNTER — COMMUNICATION - HEALTHEAST (OUTPATIENT)
Dept: FAMILY MEDICINE | Facility: CLINIC | Age: 79
End: 2019-10-29

## 2019-11-03 ENCOUNTER — COMMUNICATION - HEALTHEAST (OUTPATIENT)
Dept: CARDIOLOGY | Facility: CLINIC | Age: 79
End: 2019-11-03

## 2019-11-03 DIAGNOSIS — I42.9 CARDIOMYOPATHY, UNSPECIFIED TYPE (H): ICD-10-CM

## 2019-11-03 DIAGNOSIS — Z95.810 PRESENCE OF CARDIAC RESYNCHRONIZATION THERAPY DEFIBRILLATOR: ICD-10-CM

## 2019-11-03 DIAGNOSIS — I48.19 PERSISTENT ATRIAL FIBRILLATION (H): ICD-10-CM

## 2019-11-03 DIAGNOSIS — I48.0 PAROXYSMAL ATRIAL FIBRILLATION (H): ICD-10-CM

## 2019-11-12 ENCOUNTER — OFFICE VISIT - HEALTHEAST (OUTPATIENT)
Dept: FAMILY MEDICINE | Facility: CLINIC | Age: 79
End: 2019-11-12

## 2019-11-12 DIAGNOSIS — R10.11 RUQ ABDOMINAL PAIN: ICD-10-CM

## 2019-11-12 DIAGNOSIS — H10.9 CONJUNCTIVITIS OF BOTH EYES, UNSPECIFIED CONJUNCTIVITIS TYPE: ICD-10-CM

## 2019-11-12 DIAGNOSIS — R53.82 CHRONIC FATIGUE: ICD-10-CM

## 2019-11-15 ENCOUNTER — HOSPITAL ENCOUNTER (OUTPATIENT)
Dept: CT IMAGING | Facility: CLINIC | Age: 79
Discharge: HOME | End: 2019-11-15

## 2019-11-15 DIAGNOSIS — R10.11 RUQ ABDOMINAL PAIN: ICD-10-CM

## 2019-11-15 DIAGNOSIS — R53.82 CHRONIC FATIGUE: ICD-10-CM

## 2019-11-15 LAB
CREAT BLD-MCNC: 1.2 MG/DL (ref 0.7–1.3)
GFR SERPL CREATININE-BSD FRML MDRD: 59 ML/MIN/1.73M2

## 2019-11-27 ENCOUNTER — AMBULATORY - HEALTHEAST (OUTPATIENT)
Dept: CARDIOLOGY | Facility: CLINIC | Age: 79
End: 2019-11-27

## 2019-11-27 DIAGNOSIS — I42.8 NONISCHEMIC CARDIOMYOPATHY (H): ICD-10-CM

## 2019-11-27 DIAGNOSIS — Z95.810 PRESENCE OF CARDIAC RESYNCHRONIZATION THERAPY DEFIBRILLATOR: ICD-10-CM

## 2019-11-30 ENCOUNTER — COMMUNICATION - HEALTHEAST (OUTPATIENT)
Dept: CARDIOLOGY | Facility: CLINIC | Age: 79
End: 2019-11-30

## 2019-11-30 DIAGNOSIS — I48.20 CHRONIC ATRIAL FIBRILLATION (H): ICD-10-CM

## 2019-11-30 DIAGNOSIS — I10 HYPERTENSION: ICD-10-CM

## 2019-12-02 ENCOUNTER — COMMUNICATION - HEALTHEAST (OUTPATIENT)
Dept: CARDIOLOGY | Facility: CLINIC | Age: 79
End: 2019-12-02

## 2019-12-02 DIAGNOSIS — I10 HYPERTENSION: ICD-10-CM

## 2019-12-02 DIAGNOSIS — I50.9 CHF (CONGESTIVE HEART FAILURE) (H): ICD-10-CM

## 2019-12-16 ENCOUNTER — RECORDS - HEALTHEAST (OUTPATIENT)
Dept: ADMINISTRATIVE | Facility: OTHER | Age: 79
End: 2019-12-16

## 2020-01-13 ENCOUNTER — RECORDS - HEALTHEAST (OUTPATIENT)
Dept: ADMINISTRATIVE | Facility: OTHER | Age: 80
End: 2020-01-13

## 2020-01-20 ENCOUNTER — COMMUNICATION - HEALTHEAST (OUTPATIENT)
Dept: FAMILY MEDICINE | Facility: CLINIC | Age: 80
End: 2020-01-20

## 2020-01-20 DIAGNOSIS — G47.00 INSOMNIA: ICD-10-CM

## 2020-02-18 ENCOUNTER — OFFICE VISIT - HEALTHEAST (OUTPATIENT)
Dept: FAMILY MEDICINE | Facility: CLINIC | Age: 80
End: 2020-02-18

## 2020-02-18 DIAGNOSIS — F41.9 ANXIETY: ICD-10-CM

## 2020-02-18 DIAGNOSIS — R06.02 SOB (SHORTNESS OF BREATH): ICD-10-CM

## 2020-02-18 DIAGNOSIS — R53.83 FATIGUE, UNSPECIFIED TYPE: ICD-10-CM

## 2020-02-18 DIAGNOSIS — M62.838 MUSCLE SPASM: ICD-10-CM

## 2020-02-18 DIAGNOSIS — I42.8 NONISCHEMIC CARDIOMYOPATHY (H): ICD-10-CM

## 2020-02-18 DIAGNOSIS — E03.9 HYPOTHYROIDISM, UNSPECIFIED TYPE: ICD-10-CM

## 2020-02-18 DIAGNOSIS — I48.91 ATRIAL FIBRILLATION, UNSPECIFIED TYPE (H): ICD-10-CM

## 2020-02-18 LAB
ATRIAL RATE - MUSE: 441 BPM
DIASTOLIC BLOOD PRESSURE - MUSE: NORMAL
INTERPRETATION ECG - MUSE: NORMAL
P AXIS - MUSE: NORMAL
PR INTERVAL - MUSE: NORMAL
QRS DURATION - MUSE: 162 MS
QT - MUSE: 434 MS
QTC - MUSE: 475 MS
R AXIS - MUSE: 235 DEGREES
SYSTOLIC BLOOD PRESSURE - MUSE: NORMAL
T AXIS - MUSE: 35 DEGREES
VENTRICULAR RATE- MUSE: 72 BPM

## 2020-02-26 ENCOUNTER — COMMUNICATION - HEALTHEAST (OUTPATIENT)
Dept: FAMILY MEDICINE | Facility: CLINIC | Age: 80
End: 2020-02-26

## 2020-02-27 ENCOUNTER — AMBULATORY - HEALTHEAST (OUTPATIENT)
Dept: CARDIOLOGY | Facility: CLINIC | Age: 80
End: 2020-02-27

## 2020-02-27 DIAGNOSIS — I42.8 NONISCHEMIC CARDIOMYOPATHY (H): ICD-10-CM

## 2020-02-27 DIAGNOSIS — Z95.810 PRESENCE OF CARDIAC RESYNCHRONIZATION THERAPY DEFIBRILLATOR: ICD-10-CM

## 2020-03-11 ENCOUNTER — HOSPITAL ENCOUNTER (OUTPATIENT)
Dept: CARDIOLOGY | Facility: CLINIC | Age: 80
Discharge: HOME | End: 2020-03-11

## 2020-03-11 ENCOUNTER — AMBULATORY - HEALTHEAST (OUTPATIENT)
Dept: CARDIOLOGY | Facility: CLINIC | Age: 80
End: 2020-03-11

## 2020-03-11 DIAGNOSIS — R06.02 SOB (SHORTNESS OF BREATH): ICD-10-CM

## 2020-03-11 DIAGNOSIS — R53.83 FATIGUE, UNSPECIFIED TYPE: ICD-10-CM

## 2020-03-12 ENCOUNTER — AMBULATORY - HEALTHEAST (OUTPATIENT)
Dept: FAMILY MEDICINE | Facility: CLINIC | Age: 80
End: 2020-03-12

## 2020-03-12 DIAGNOSIS — I42.9 CARDIOMYOPATHY, UNSPECIFIED TYPE (H): ICD-10-CM

## 2020-03-12 LAB
AORTIC ROOT: 3.5 CM
AORTIC VALVE MEAN VELOCITY: 104 CM/S
ASCENDING AORTA: 3 CM
AV DIMENSIONLESS INDEX VTI: 0.7
AV MEAN GRADIENT: 5 MMHG
AV PEAK GRADIENT: 6.9 MMHG
AV VALVE AREA: 2.3 CM2
AV VELOCITY RATIO: 0.7
DOP CALC AO PEAK VEL: 131 CM/S
DOP CALC AO VTI: 30 CM
DOP CALC LVOT AREA: 3.46 CM2
DOP CALC LVOT DIAMETER: 2.1 CM
DOP CALC LVOT PEAK VEL: 90 CM/S
DOP CALC LVOT STROKE VOLUME: 67.9 CM3
DOP CALCLVOT PEAK VEL VTI: 19.6 CM
ECHO EJECTION FRACTION ESTIMATED: 25 %
FRACTIONAL SHORTENING: 18.3 % (ref 28–44)
INTERVENTRICULAR SEPTUM IN END DIASTOLE: 0.84 CM (ref 0.6–1)
IVS/PW RATIO: 1
LA AREA 1: 30.3 CM2
LA AREA 2: 33.3 CM2
LEFT ATRIUM LENGTH: 7 CM
LEFT ATRIUM SIZE: 3.8 CM
LEFT ATRIUM TO AORTIC ROOT RATIO: 1.09 NO UNITS
LEFT ATRIUM VOLUME: 122.5 ML
LEFT VENTRICULAR INTERNAL DIMENSION IN DIASTOLE: 6 CM (ref 4.2–5.8)
LEFT VENTRICULAR INTERNAL DIMENSION IN SYSTOLE: 4.9 CM (ref 2.5–4)
LEFT VENTRICULAR MASS: 198.5 G
LEFT VENTRICULAR OUTFLOW TRACT MEAN GRADIENT: 2 MMHG
LEFT VENTRICULAR OUTFLOW TRACT MEAN VELOCITY: 68.2 CM/S
LEFT VENTRICULAR OUTFLOW TRACT PEAK GRADIENT: 3 MMHG
LEFT VENTRICULAR POSTERIOR WALL IN END DIASTOLE: 0.85 CM (ref 0.6–1)
MITRAL REGURGITANT VELOCITY TIME INTEGRAL: 177 CM
MR MEAN GRADIENT: 63 MMHG
MR MEAN VELOCITY: 377 CM/S
MR PEAK GRADIENT: 94.1 MMHG
MV DECELERATION TIME: 218 MS
MV PEAK E VELOCITY: 99.9 CM/S
PISA MR PEAK VEL: 485 CM/S
STRESS ECHO TARGET HR: 142
TRICUSPID REGURGITATION PEAK PRESSURE GRADIENT: 39.7 MMHG
TRICUSPID VALVE ANULAR PLANE SYSTOLIC EXCURSION: 1.4 CM
TRICUSPID VALVE PEAK REGURGITANT VELOCITY: 315 CM/S

## 2020-03-19 ENCOUNTER — COMMUNICATION - HEALTHEAST (OUTPATIENT)
Dept: SCHEDULING | Facility: CLINIC | Age: 80
End: 2020-03-19

## 2020-03-20 ENCOUNTER — OFFICE VISIT - HEALTHEAST (OUTPATIENT)
Dept: FAMILY MEDICINE | Facility: CLINIC | Age: 80
End: 2020-03-20

## 2020-03-20 DIAGNOSIS — G47.00 INSOMNIA: ICD-10-CM

## 2020-03-20 DIAGNOSIS — R07.89 CHEST PRESSURE: ICD-10-CM

## 2020-03-20 DIAGNOSIS — E03.9 HYPOTHYROIDISM, UNSPECIFIED TYPE: ICD-10-CM

## 2020-03-20 DIAGNOSIS — F41.9 ANXIETY: ICD-10-CM

## 2020-03-20 DIAGNOSIS — G47.00 INSOMNIA, UNSPECIFIED TYPE: ICD-10-CM

## 2020-03-20 DIAGNOSIS — R06.09 DYSPNEA ON EXERTION: ICD-10-CM

## 2020-03-20 DIAGNOSIS — I42.8 NONISCHEMIC CARDIOMYOPATHY (H): ICD-10-CM

## 2020-03-20 DIAGNOSIS — N18.30 CKD (CHRONIC KIDNEY DISEASE) STAGE 3, GFR 30-59 ML/MIN (H): ICD-10-CM

## 2020-03-29 ENCOUNTER — COMMUNICATION - HEALTHEAST (OUTPATIENT)
Dept: CARDIOLOGY | Facility: CLINIC | Age: 80
End: 2020-03-29

## 2020-03-30 ENCOUNTER — AMBULATORY - HEALTHEAST (OUTPATIENT)
Dept: CARDIOLOGY | Facility: CLINIC | Age: 80
End: 2020-03-30

## 2020-03-30 ENCOUNTER — COMMUNICATION - HEALTHEAST (OUTPATIENT)
Dept: CARDIOLOGY | Facility: CLINIC | Age: 80
End: 2020-03-30

## 2020-03-30 DIAGNOSIS — I50.9 CHF (CONGESTIVE HEART FAILURE) (H): ICD-10-CM

## 2020-04-01 ENCOUNTER — OFFICE VISIT - HEALTHEAST (OUTPATIENT)
Dept: CARDIOLOGY | Facility: CLINIC | Age: 80
End: 2020-04-01

## 2020-04-01 DIAGNOSIS — I50.22 CHRONIC SYSTOLIC HEART FAILURE (H): ICD-10-CM

## 2020-04-01 DIAGNOSIS — I42.8 NONISCHEMIC CARDIOMYOPATHY (H): ICD-10-CM

## 2020-04-02 ENCOUNTER — COMMUNICATION - HEALTHEAST (OUTPATIENT)
Dept: CARDIOLOGY | Facility: CLINIC | Age: 80
End: 2020-04-02

## 2020-04-02 DIAGNOSIS — I48.91 ATRIAL FIBRILLATION, UNSPECIFIED TYPE (H): ICD-10-CM

## 2020-04-06 ENCOUNTER — COMMUNICATION - HEALTHEAST (OUTPATIENT)
Dept: FAMILY MEDICINE | Facility: CLINIC | Age: 80
End: 2020-04-06

## 2020-04-13 ENCOUNTER — AMBULATORY - HEALTHEAST (OUTPATIENT)
Dept: LAB | Facility: CLINIC | Age: 80
End: 2020-04-13

## 2020-04-13 ENCOUNTER — AMBULATORY - HEALTHEAST (OUTPATIENT)
Dept: FAMILY MEDICINE | Facility: CLINIC | Age: 80
End: 2020-04-13

## 2020-04-13 DIAGNOSIS — I50.9 CHF (CONGESTIVE HEART FAILURE) (H): ICD-10-CM

## 2020-04-13 DIAGNOSIS — R53.83 FATIGUE, UNSPECIFIED TYPE: ICD-10-CM

## 2020-04-13 DIAGNOSIS — R79.89 ABNORMAL LFTS: ICD-10-CM

## 2020-04-13 LAB
ALBUMIN SERPL-MCNC: 4.3 G/DL (ref 3.5–5)
ALP SERPL-CCNC: 74 U/L (ref 45–120)
ALT SERPL W P-5'-P-CCNC: 19 U/L (ref 0–45)
ANION GAP SERPL CALCULATED.3IONS-SCNC: 7 MMOL/L (ref 5–18)
AST SERPL W P-5'-P-CCNC: 25 U/L (ref 0–40)
BILIRUB DIRECT SERPL-MCNC: 0.2 MG/DL
BILIRUB SERPL-MCNC: 0.9 MG/DL (ref 0–1)
BUN SERPL-MCNC: 20 MG/DL (ref 8–28)
CALCIUM SERPL-MCNC: 9.6 MG/DL (ref 8.5–10.5)
CHLORIDE BLD-SCNC: 98 MMOL/L (ref 98–107)
CO2 SERPL-SCNC: 31 MMOL/L (ref 22–31)
CREAT SERPL-MCNC: 1.37 MG/DL (ref 0.7–1.3)
ERYTHROCYTE [DISTWIDTH] IN BLOOD BY AUTOMATED COUNT: 12.5 % (ref 11–14.5)
GFR SERPL CREATININE-BSD FRML MDRD: 50 ML/MIN/1.73M2
GLUCOSE BLD-MCNC: 99 MG/DL (ref 70–125)
HCT VFR BLD AUTO: 44.2 % (ref 40–54)
HGB BLD-MCNC: 15 G/DL (ref 14–18)
LIPASE SERPL-CCNC: 62 U/L (ref 0–52)
MCH RBC QN AUTO: 33.5 PG (ref 27–34)
MCHC RBC AUTO-ENTMCNC: 33.9 G/DL (ref 32–36)
MCV RBC AUTO: 99 FL (ref 80–100)
PLATELET # BLD AUTO: 208 THOU/UL (ref 140–440)
PMV BLD AUTO: 7.7 FL (ref 7–10)
POTASSIUM BLD-SCNC: 4.5 MMOL/L (ref 3.5–5)
PROT SERPL-MCNC: 7.7 G/DL (ref 6–8)
RBC # BLD AUTO: 4.48 MILL/UL (ref 4.4–6.2)
SODIUM SERPL-SCNC: 136 MMOL/L (ref 136–145)
TSH SERPL DL<=0.005 MIU/L-ACNC: 4.68 UIU/ML (ref 0.3–5)
WBC: 6.5 THOU/UL (ref 4–11)

## 2020-04-14 ENCOUNTER — COMMUNICATION - HEALTHEAST (OUTPATIENT)
Dept: CARDIOLOGY | Facility: CLINIC | Age: 80
End: 2020-04-14

## 2020-04-14 ENCOUNTER — AMBULATORY - HEALTHEAST (OUTPATIENT)
Dept: CARDIOLOGY | Facility: CLINIC | Age: 80
End: 2020-04-14

## 2020-04-17 ENCOUNTER — OFFICE VISIT - HEALTHEAST (OUTPATIENT)
Dept: FAMILY MEDICINE | Facility: CLINIC | Age: 80
End: 2020-04-17

## 2020-04-17 DIAGNOSIS — L23.9 ALLERGIC DERMATITIS: ICD-10-CM

## 2020-04-22 ENCOUNTER — COMMUNICATION - HEALTHEAST (OUTPATIENT)
Dept: CARDIOLOGY | Facility: CLINIC | Age: 80
End: 2020-04-22

## 2020-04-22 ENCOUNTER — OFFICE VISIT - HEALTHEAST (OUTPATIENT)
Dept: CARDIOLOGY | Facility: CLINIC | Age: 80
End: 2020-04-22

## 2020-04-22 DIAGNOSIS — I42.8 NONISCHEMIC CARDIOMYOPATHY (H): ICD-10-CM

## 2020-04-22 DIAGNOSIS — I50.22 CHRONIC SYSTOLIC HEART FAILURE (H): ICD-10-CM

## 2020-05-01 ENCOUNTER — COMMUNICATION - HEALTHEAST (OUTPATIENT)
Dept: CARDIOLOGY | Facility: CLINIC | Age: 80
End: 2020-05-01

## 2020-05-05 ENCOUNTER — OFFICE VISIT - HEALTHEAST (OUTPATIENT)
Dept: CARDIOLOGY | Facility: CLINIC | Age: 80
End: 2020-05-05

## 2020-05-05 DIAGNOSIS — I50.22 CHRONIC SYSTOLIC HEART FAILURE (H): ICD-10-CM

## 2020-05-08 ENCOUNTER — AMBULATORY - HEALTHEAST (OUTPATIENT)
Dept: CARDIOLOGY | Facility: CLINIC | Age: 80
End: 2020-05-08

## 2020-05-08 DIAGNOSIS — Z95.810 PRESENCE OF CARDIAC RESYNCHRONIZATION THERAPY DEFIBRILLATOR: ICD-10-CM

## 2020-05-08 DIAGNOSIS — I42.8 NONISCHEMIC CARDIOMYOPATHY (H): ICD-10-CM

## 2020-05-09 ENCOUNTER — COMMUNICATION - HEALTHEAST (OUTPATIENT)
Dept: CARDIOLOGY | Facility: CLINIC | Age: 80
End: 2020-05-09

## 2020-05-09 DIAGNOSIS — I50.9 CHF (CONGESTIVE HEART FAILURE) (H): ICD-10-CM

## 2020-05-11 ENCOUNTER — OFFICE VISIT - HEALTHEAST (OUTPATIENT)
Dept: CARDIOLOGY | Facility: CLINIC | Age: 80
End: 2020-05-11

## 2020-05-11 DIAGNOSIS — I50.22 CHRONIC SYSTOLIC HEART FAILURE (H): ICD-10-CM

## 2020-05-11 DIAGNOSIS — I48.21 PERMANENT ATRIAL FIBRILLATION (H): ICD-10-CM

## 2020-05-11 DIAGNOSIS — I42.8 NONISCHEMIC CARDIOMYOPATHY (H): ICD-10-CM

## 2020-05-12 ENCOUNTER — COMMUNICATION - HEALTHEAST (OUTPATIENT)
Dept: FAMILY MEDICINE | Facility: CLINIC | Age: 80
End: 2020-05-12

## 2020-05-12 DIAGNOSIS — F41.9 ANXIETY: ICD-10-CM

## 2020-05-15 ENCOUNTER — AMBULATORY - HEALTHEAST (OUTPATIENT)
Dept: LAB | Facility: CLINIC | Age: 80
End: 2020-05-15

## 2020-05-15 ENCOUNTER — COMMUNICATION - HEALTHEAST (OUTPATIENT)
Dept: CARDIOLOGY | Facility: CLINIC | Age: 80
End: 2020-05-15

## 2020-05-15 DIAGNOSIS — I50.22 CHRONIC SYSTOLIC HEART FAILURE (H): ICD-10-CM

## 2020-05-15 DIAGNOSIS — I42.8 NONISCHEMIC CARDIOMYOPATHY (H): ICD-10-CM

## 2020-05-15 DIAGNOSIS — I50.20 HFREF (HEART FAILURE WITH REDUCED EJECTION FRACTION) (H): ICD-10-CM

## 2020-05-15 LAB
ANION GAP SERPL CALCULATED.3IONS-SCNC: 9 MMOL/L (ref 5–18)
BUN SERPL-MCNC: 18 MG/DL (ref 8–28)
CALCIUM SERPL-MCNC: 9.3 MG/DL (ref 8.5–10.5)
CHLORIDE BLD-SCNC: 102 MMOL/L (ref 98–107)
CO2 SERPL-SCNC: 29 MMOL/L (ref 22–31)
CREAT SERPL-MCNC: 1.29 MG/DL (ref 0.7–1.3)
GFR SERPL CREATININE-BSD FRML MDRD: 54 ML/MIN/1.73M2
GLUCOSE BLD-MCNC: 93 MG/DL (ref 70–125)
POTASSIUM BLD-SCNC: 5.2 MMOL/L (ref 3.5–5)
SODIUM SERPL-SCNC: 140 MMOL/L (ref 136–145)

## 2020-05-18 ENCOUNTER — COMMUNICATION - HEALTHEAST (OUTPATIENT)
Dept: CARDIOLOGY | Facility: CLINIC | Age: 80
End: 2020-05-18

## 2020-05-26 ENCOUNTER — COMMUNICATION - HEALTHEAST (OUTPATIENT)
Dept: CARDIOLOGY | Facility: CLINIC | Age: 80
End: 2020-05-26

## 2020-05-27 ENCOUNTER — AMBULATORY - HEALTHEAST (OUTPATIENT)
Dept: LAB | Facility: CLINIC | Age: 80
End: 2020-05-27

## 2020-05-27 DIAGNOSIS — I50.20 HFREF (HEART FAILURE WITH REDUCED EJECTION FRACTION) (H): ICD-10-CM

## 2020-05-27 DIAGNOSIS — I42.8 NONISCHEMIC CARDIOMYOPATHY (H): ICD-10-CM

## 2020-05-27 LAB
ANION GAP SERPL CALCULATED.3IONS-SCNC: 6 MMOL/L (ref 5–18)
BUN SERPL-MCNC: 24 MG/DL (ref 8–28)
CALCIUM SERPL-MCNC: 8.9 MG/DL (ref 8.5–10.5)
CHLORIDE BLD-SCNC: 102 MMOL/L (ref 98–107)
CO2 SERPL-SCNC: 33 MMOL/L (ref 22–31)
CREAT SERPL-MCNC: 1.38 MG/DL (ref 0.7–1.3)
GFR SERPL CREATININE-BSD FRML MDRD: 50 ML/MIN/1.73M2
GLUCOSE BLD-MCNC: 87 MG/DL (ref 70–125)
POTASSIUM BLD-SCNC: 4.8 MMOL/L (ref 3.5–5)
SODIUM SERPL-SCNC: 141 MMOL/L (ref 136–145)

## 2020-05-28 ENCOUNTER — AMBULATORY - HEALTHEAST (OUTPATIENT)
Dept: CARDIOLOGY | Facility: CLINIC | Age: 80
End: 2020-05-28

## 2020-05-28 DIAGNOSIS — I48.0 PAROXYSMAL ATRIAL FIBRILLATION (H): ICD-10-CM

## 2020-05-28 DIAGNOSIS — Z95.810 PRESENCE OF CARDIAC RESYNCHRONIZATION THERAPY DEFIBRILLATOR: ICD-10-CM

## 2020-05-28 DIAGNOSIS — I48.91 ATRIAL FIBRILLATION (H): ICD-10-CM

## 2020-05-29 ENCOUNTER — OFFICE VISIT - HEALTHEAST (OUTPATIENT)
Dept: CARDIOLOGY | Facility: CLINIC | Age: 80
End: 2020-05-29

## 2020-05-29 ENCOUNTER — AMBULATORY - HEALTHEAST (OUTPATIENT)
Dept: LAB | Facility: CLINIC | Age: 80
End: 2020-05-29

## 2020-05-29 DIAGNOSIS — I42.8 NONISCHEMIC CARDIOMYOPATHY (H): ICD-10-CM

## 2020-05-29 DIAGNOSIS — I50.22 CHRONIC SYSTOLIC HEART FAILURE (H): ICD-10-CM

## 2020-06-05 ENCOUNTER — AMBULATORY - HEALTHEAST (OUTPATIENT)
Dept: CARDIOLOGY | Facility: CLINIC | Age: 80
End: 2020-06-05

## 2020-06-08 ENCOUNTER — COMMUNICATION - HEALTHEAST (OUTPATIENT)
Dept: CARDIOLOGY | Facility: CLINIC | Age: 80
End: 2020-06-08

## 2020-06-08 DIAGNOSIS — I50.22 CHRONIC SYSTOLIC HEART FAILURE (H): ICD-10-CM

## 2020-06-08 RX ORDER — FUROSEMIDE 20 MG
40 TABLET ORAL DAILY
Qty: 90 TABLET | Refills: 3 | Status: SHIPPED | OUTPATIENT
Start: 2020-06-08

## 2020-06-22 ENCOUNTER — OFFICE VISIT - HEALTHEAST (OUTPATIENT)
Dept: CARDIOLOGY | Facility: CLINIC | Age: 80
End: 2020-06-22

## 2020-06-22 DIAGNOSIS — I42.8 NONISCHEMIC CARDIOMYOPATHY (H): ICD-10-CM

## 2020-06-22 DIAGNOSIS — I50.20 HFREF (HEART FAILURE WITH REDUCED EJECTION FRACTION) (H): ICD-10-CM

## 2020-06-24 ENCOUNTER — AMBULATORY - HEALTHEAST (OUTPATIENT)
Dept: LAB | Facility: CLINIC | Age: 80
End: 2020-06-24

## 2020-06-24 DIAGNOSIS — I50.20 HFREF (HEART FAILURE WITH REDUCED EJECTION FRACTION) (H): ICD-10-CM

## 2020-06-24 LAB
ANION GAP SERPL CALCULATED.3IONS-SCNC: 10 MMOL/L (ref 5–18)
BUN SERPL-MCNC: 20 MG/DL (ref 8–28)
CALCIUM SERPL-MCNC: 9.6 MG/DL (ref 8.5–10.5)
CHLORIDE BLD-SCNC: 103 MMOL/L (ref 98–107)
CO2 SERPL-SCNC: 24 MMOL/L (ref 22–31)
CREAT SERPL-MCNC: 1.31 MG/DL (ref 0.7–1.3)
GFR SERPL CREATININE-BSD FRML MDRD: 53 ML/MIN/1.73M2
GLUCOSE BLD-MCNC: 102 MG/DL (ref 70–125)
POTASSIUM BLD-SCNC: 5.1 MMOL/L (ref 3.5–5)
SODIUM SERPL-SCNC: 137 MMOL/L (ref 136–145)

## 2020-06-25 ENCOUNTER — COMMUNICATION - HEALTHEAST (OUTPATIENT)
Dept: CARDIOLOGY | Facility: CLINIC | Age: 80
End: 2020-06-25

## 2020-06-25 DIAGNOSIS — I50.20 HFREF (HEART FAILURE WITH REDUCED EJECTION FRACTION) (H): ICD-10-CM

## 2020-06-29 ENCOUNTER — COMMUNICATION - HEALTHEAST (OUTPATIENT)
Dept: CARDIOLOGY | Facility: CLINIC | Age: 80
End: 2020-06-29

## 2020-06-29 ENCOUNTER — AMBULATORY - HEALTHEAST (OUTPATIENT)
Dept: CARDIOLOGY | Facility: CLINIC | Age: 80
End: 2020-06-29

## 2020-06-29 DIAGNOSIS — I50.20 HFREF (HEART FAILURE WITH REDUCED EJECTION FRACTION) (H): ICD-10-CM

## 2020-07-07 ENCOUNTER — COMMUNICATION - HEALTHEAST (OUTPATIENT)
Dept: CARDIOLOGY | Facility: CLINIC | Age: 80
End: 2020-07-07

## 2020-07-15 ENCOUNTER — AMBULATORY - HEALTHEAST (OUTPATIENT)
Dept: LAB | Facility: CLINIC | Age: 80
End: 2020-07-15

## 2020-07-15 DIAGNOSIS — I50.20 HFREF (HEART FAILURE WITH REDUCED EJECTION FRACTION) (H): ICD-10-CM

## 2020-07-15 DIAGNOSIS — I50.22 CHRONIC SYSTOLIC HEART FAILURE (H): ICD-10-CM

## 2020-07-15 LAB
ANION GAP SERPL CALCULATED.3IONS-SCNC: 8 MMOL/L (ref 5–18)
BUN SERPL-MCNC: 21 MG/DL (ref 8–28)
CALCIUM SERPL-MCNC: 9.1 MG/DL (ref 8.5–10.5)
CHLORIDE BLD-SCNC: 103 MMOL/L (ref 98–107)
CO2 SERPL-SCNC: 27 MMOL/L (ref 22–31)
CREAT SERPL-MCNC: 1.09 MG/DL (ref 0.7–1.3)
GFR SERPL CREATININE-BSD FRML MDRD: >60 ML/MIN/1.73M2
GLUCOSE BLD-MCNC: 85 MG/DL (ref 70–125)
POTASSIUM BLD-SCNC: 4.6 MMOL/L (ref 3.5–5)
SODIUM SERPL-SCNC: 138 MMOL/L (ref 136–145)

## 2020-07-17 ENCOUNTER — OFFICE VISIT - HEALTHEAST (OUTPATIENT)
Dept: CARDIOLOGY | Facility: CLINIC | Age: 80
End: 2020-07-17

## 2020-07-17 DIAGNOSIS — I42.8 NONISCHEMIC CARDIOMYOPATHY (H): ICD-10-CM

## 2020-07-17 DIAGNOSIS — I50.20 HFREF (HEART FAILURE WITH REDUCED EJECTION FRACTION) (H): ICD-10-CM

## 2020-07-29 ENCOUNTER — COMMUNICATION - HEALTHEAST (OUTPATIENT)
Dept: CARDIOLOGY | Facility: CLINIC | Age: 80
End: 2020-07-29

## 2020-07-29 DIAGNOSIS — I42.9 CARDIOMYOPATHY, UNSPECIFIED TYPE (H): ICD-10-CM

## 2020-07-29 DIAGNOSIS — I48.19 PERSISTENT ATRIAL FIBRILLATION (H): ICD-10-CM

## 2020-07-29 DIAGNOSIS — I48.0 PAROXYSMAL ATRIAL FIBRILLATION (H): ICD-10-CM

## 2020-07-29 DIAGNOSIS — Z95.810 PRESENCE OF CARDIAC RESYNCHRONIZATION THERAPY DEFIBRILLATOR: ICD-10-CM

## 2020-07-29 RX ORDER — CARVEDILOL 25 MG/1
TABLET ORAL
Qty: 180 TABLET | Refills: 2 | Status: SHIPPED | OUTPATIENT
Start: 2020-07-29

## 2020-07-31 ENCOUNTER — COMMUNICATION - HEALTHEAST (OUTPATIENT)
Dept: FAMILY MEDICINE | Facility: CLINIC | Age: 80
End: 2020-07-31

## 2020-07-31 DIAGNOSIS — F41.9 ANXIETY: ICD-10-CM

## 2020-07-31 RX ORDER — CITALOPRAM HYDROBROMIDE 10 MG/1
10 TABLET ORAL DAILY
Qty: 30 TABLET | Refills: 2 | Status: SHIPPED | OUTPATIENT
Start: 2020-07-31

## 2020-09-08 ENCOUNTER — COMMUNICATION - HEALTHEAST (OUTPATIENT)
Dept: FAMILY MEDICINE | Facility: CLINIC | Age: 80
End: 2020-09-08

## 2020-09-08 DIAGNOSIS — G47.00 INSOMNIA: ICD-10-CM

## 2020-09-08 RX ORDER — ZOLPIDEM TARTRATE 10 MG/1
5-10 TABLET ORAL
Qty: 30 TABLET | Refills: 5 | Status: SHIPPED | OUTPATIENT
Start: 2020-09-08

## 2020-10-13 ENCOUNTER — COMMUNICATION - HEALTHEAST (OUTPATIENT)
Dept: CARDIOLOGY | Facility: CLINIC | Age: 80
End: 2020-10-13

## 2020-10-13 DIAGNOSIS — I48.91 ATRIAL FIBRILLATION, UNSPECIFIED TYPE (H): ICD-10-CM

## 2020-11-25 ENCOUNTER — COMMUNICATION - HEALTHEAST (OUTPATIENT)
Dept: CARDIOLOGY | Facility: CLINIC | Age: 80
End: 2020-11-25

## 2021-05-28 NOTE — TELEPHONE ENCOUNTER
Controlled Substance Refill Request  Medication:   Requested Prescriptions     Pending Prescriptions Disp Refills     zolpidem (AMBIEN) 10 mg tablet [Pharmacy Med Name: Zolpidem Tartrate Oral Tablet 10 MG] 30 tablet 1     Sig: TAKE 1 TABLET BY MOUTH AT BEDTIME AS NEEDED FOR SLEEP     Date Last Fill:  2/1/19  #30 R-0  Pharmacy: [facility]   Submit electronically to pharmacy  Controlled Substance Agreement on File:   Encounter-Level CSA Scan Date:    There are no encounter-level csa scan date.       Last office visit: 11/7/2018 Collin Blanco MD Katie Beck RN Triage Nurse Advisor Care Connection

## 2021-05-28 NOTE — TELEPHONE ENCOUNTER
Refill Approved    Rx renewed per Medication Renewal Policy. Medication was last renewed on 1/19/19.    Iveth Osborn, [facility] Triage/Med Refill 4/18/2019     Requested Prescriptions   Pending Prescriptions Disp Refills     levothyroxine (SYNTHROID, LEVOTHROID) 125 MCG tablet [Pharmacy Med Name: Levothyroxine Sodium Oral Tablet 125 MCG] 90 tablet 0     Sig: Take 1 tablet (125 mcg total) by mouth every morning.       Thyroid Hormones Protocol Passed - 4/17/2019  9:51 AM        Passed - Provider visit in past 12 months or next 3 months     Last office visit with prescriber/PCP: 11/7/2018 Collin Blanco MD OR same dept: 11/7/2018 Collin Blanco MD OR same specialty: 11/7/2018 Collin Blanco MD  Last physical: Visit date not found Last MTM visit: Visit date not found   Next visit within 3 mo: Visit date not found  Next physical within 3 mo: Visit date not found  Prescriber OR PCP: Collin Blanco MD  Last diagnosis associated with med order: 1. Hypothyroid  - levothyroxine (SYNTHROID, LEVOTHROID) 125 MCG tablet [Pharmacy Med Name: Levothyroxine Sodium Oral Tablet 125 MCG]; Take 1 tablet (125 mcg total) by mouth every morning.  Dispense: 90 tablet; Refill: 0    If protocol passes may refill for 12 months if within 3 months of last provider visit (or a total of 15 months).             Passed - TSH on file in past 12 months for patient age 12 & older     TSH   Date Value Ref Range Status   11/07/2018 1.92 0.30 - 5.00 uIU/mL Final

## 2021-05-29 ENCOUNTER — RECORDS - HEALTHEAST (OUTPATIENT)
Dept: ADMINISTRATIVE | Facility: CLINIC | Age: 81
End: 2021-05-29

## 2021-05-29 NOTE — TELEPHONE ENCOUNTER
----- Message from Debra Brenner sent at 6/24/2019  2:35 PM CDT -----  Contact: PATIENT  General phone call:  ABOUT TWO WEEKS AGO HIS ENERGY LEVEL WENT DOWN, WHAT SHOULD PATIENT DO? HAVE ANOTHER CHECK?  COME IN? PLEASE CALL  Caller: PATIENT  Primary cardiologist: DR ARANA  Detailed reason for call: SEE ABOVE  New or active symptoms? YES, SEE ABOVE  Best phone number: [phone]  Best time to contact: ANY TIME  Ok to leave a detailed message? YES  Device? YES    Additional Info:

## 2021-05-29 NOTE — TELEPHONE ENCOUNTER
Phone call from patient stating that he has noticed significant fatigue for about the last 2 weeks.  Pt states his blood pressure and pulse are normal.  He has noted a small amount of swelling to his lower extremities and feet, though he states this has been going on for several months.  He notes that last fall he had heaviness in his legs that was worked up but did not get diagnosed.  He states this went away in January but came back about 2 weeks ago.    Pt does have a CRT-D, last device check showed normal function, with BIV pacing at 73% (unchanged from previous) with persistent afib, heart rates controlled.  Pt would like to send in a remote transmission to see if anything has changed with his device.  Will ask Device team to contact patient for a remote transmission and will call patient to review once complete.  Pt states understanding.

## 2021-05-30 VITALS — BODY MASS INDEX: 24.73 KG/M2 | WEIGHT: 190 LBS

## 2021-05-30 VITALS — WEIGHT: 195.3 LBS | BODY MASS INDEX: 25.07 KG/M2 | HEIGHT: 74 IN

## 2021-05-30 NOTE — PROGRESS NOTES
Assessment/Plan:  I spent over 40 minutes with the patient with greater than 50% spent discussing symptoms, treatment options, and coordination of care.     1. Fatigue, unspecified type  We discussed broad differentialof fatigue including infectious etiology, cardiovascular nature, thyroid etc.  Physical exam today is benign.  Chest x-ray today is unremarkable, radiology has confirmed this.  EKG also performed and interpreted in clinic, reviewed with Dr. Blanco which does not indicate any acute changes.  Urinalysis and complete blood count are normal without concern for infection.  Will await comprehensive metabolic panel, thyroid cascade and follow-up with patient regarding results when available.  In the meantime, advised patient to continue with his current medications and follow-up with cardiology when able to be seen.  He will continue to monitor his symptoms and notify us if he develops any new or worsening symptoms.  - HM1(CBC and Differential)  - Comprehensive Metabolic Panel  - Thyroid Cascade  - XR Chest 2 Views  - Electrocardiogram Perform and Read  - HM1 (CBC with Diff)  - Urinalysis-UC if Indicated    2. SOB (shortness of breath)  Mild shortness of breath.  Lung exam is clear.  Chest x-ray is normal.  EKG without acute findings.  Continue to monitor at this time.  - XR Chest 2 Views  - Electrocardiogram Perform and Read    3. Bilateral leg edema  Trace edema noted on exam today.  EKG normal as noted.  We will check a BNP level.  - BNP(B-type Natriuretic Peptide)  - Electrocardiogram Perform and Read    4. Chronic atrial fibrillation (H)  Patient has pacemaker/defibrillator in place.  Reviewed device check from Monday, 6/24/2019 which was normal.  He remains anticoagulated on Xarelto 20 mg daily.  - Electrocardiogram Perform and Read    5. Nonischemic cardiomyopathy (H)  Cardiomyopathy remains stable.  Continue to follow with cardiology.  - Electrocardiogram Perform and Read  -Chest x-ray    6.  Hypothyroidism, unspecified type  History of hypothyroidism.  Remains on levothyroxine 125 MCG every morning.  Will check thyroid cascade today to ensure stability.  - Thyroid Cascade    Subjective:    Glenn Caballero is a 79 year old male seen today for evaluation of fatigue and bilateral leg swelling.  Past medical history includes atrial fibrillation, cardiomyopathy, dyslipidemia, hypothyroidism, reflux.  Patient has been noticing more fatigue over the past few weeks.  Recalls having similar symptoms in the past which were always related to his atrial fibrillation.  He has history of cardioversions, has had make pacemaker placed roughly 6 years ago and again 4 years ago pacemaker switched to 1 with a defibrillator. Patient recalls having very similar symptoms of fatigue and heavy feeling in his lower legs and feet last fall 2018.  Also noted that his feet were feeling colder than usual.  He had an MRI at that time and was told that it was likely peripheral neuropathy.  He was sent to a neurologist who did extensive work-up and did not find any other cause for his symptoms.  He was then referred to an oncologist who also could not find any underlying condition that would explain his symptoms.  The symptoms went away for the most part.  Two weeks ago he developed similar symptoms of lowwe energy level and heaviness in his legs.  At times he feels more out of shape and short of breath.  Shortness of breath is not significant, it is not accompanied by a cough or chest pain.  No PND.  He has somewhat of a decreased appetite.  He denies any change in his medications.  He did call his cardiologist earlier this week who recommend that he be seen by primary care.  He had a device check on Monday which was normal.  Patient recalls having a stress test earlier this year.  His ejection fracture was improved which was reassuring.  Cardiology appointment is scheduled in upcoming 3 months or so.  He denies any shortness of  breath, chest pain or lower extremity edema currently.  Edema typically occurs at the end of the day after he has been on his feet for a while.  It is bilateral.  He denies any pain in his calves.  He denies any recent illnesses.  No fevers, chills, body aches or night sweats.  No recent travel.  No change in his medications.  He does not have any additional concerns today.  Review of systems is as stated in HPI, and the remainder of the 10 system review is otherwise unremarkable.    Past Medical History, Family History, and Social History reviewed.    No children  Tobacco: quit after teenager  EtOH: occ beer now and then  Mom -  85 diabetes  Dad -  MVA  2 bros -   3 sis - one  due to colon CA; youngest sis with end-stage leukemia  Surgeries: pacemaker; cardiac ablation  Hospitalizations: a. 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 Blanco I need your help,  I was taking Zolpidem 10 mg to help me sleep. It worked just fine. You switched me to Temazepam, 15mg, it doesn't work for me, I wake up every two hours and can't get back to sleep. I took it on 4 different nights and slept very little. I would like to go back to Zolpidem. I have 2 left.    Past Surgical History:   Procedure Laterality Date     ABLATION OF DYSRHYTHMIC FOCUS       CARDIAC DEFIBRILLATOR PLACEMENT Left 2016    [employer]     CARDIOVERSION  2016     INSERT / REPLACE / REMOVE PACEMAKER       OR ABLATE HEART DYSRHYTHM FOCUS      Description: Catheter Ablation Atrial Flutter;  Recorded: 2012;  Comments:  for typical right isthmus dependent flutter     OR CARDIOVERSION ELECTIVE ARRHYTHMIA EXTERNAL  6/16/15; 7/13/15    Description: Elective Cardioversion External;  Recorded: 2012;  Comments:  5/18/12 for afib     pvi ablation  1/25/16        Family History   Problem Relation Age of Onset     Diabetes Mother      Colon cancer Sister      Cancer Sister         BLOOD CANCER        Past Medical History:   Diagnosis Date     Arrhythmia     Afib     Atrial fibrillation (H)      Atrial flutter (H)      Cardiomyopathy (H)      CHF (congestive heart failure) (H)      Disease of thyroid gland     Hypothyroid     Hyperlipidemia      Hypertension      Pacemaker      Peptic ulcer disease      Upper GI bleed     due to Duodenal ulcer from NSAID and Warfarin        Social History     Tobacco Use     Smoking status: Never Smoker     Smokeless tobacco: Never Used   Substance Use Topics     Alcohol use: Yes     Alcohol/week: 1.8 oz     Types: 3 Cans of beer per week     Drug use: No        Current Outpatient Medications   Medication Sig Dispense Refill     acetaminophen (TYLENOL) 500 MG tablet Take 1,000 mg by mouth every 6 (six) hours as needed for pain.       ascorbic acid (VITAMIN C) 500 MG tablet Take 500 mg by mouth 2 (two) times a day.       carvedilol (COREG) 25 MG tablet TAKE ONE TABLET BY MOUTH TWICE DAILY with meals 180 tablet 1     furosemide (LASIX) 20 MG tablet Take 2 tablets (40 mg total) by mouth daily. 180 tablet 2     irbesartan (AVAPRO) 150 MG tablet TAKE ONE TABLET BY MOUTH ONE TIME DAILY 90 tablet 3     levothyroxine (SYNTHROID, LEVOTHROID) 125 MCG tablet Take 1 tablet (125 mcg total) by mouth every morning. 90 tablet 1     multivitamin Liqd solution Take 5 mL by mouth every morning.        rivaroxaban (XARELTO) 20 mg Tab Take 1 tablet (20 mg total) by mouth daily with supper. 90 tablet 2     zolpidem (AMBIEN) 10 mg tablet TAKE 1 TABLET BY MOUTH AT BEDTIME AS NEEDED FOR SLEEP 30 tablet 2     No current facility-administered medications for this visit.           Objective:    Vitals:    06/26/19 0843   BP: 110/60   Patient Site: Left Arm   Patient Position: Sitting   Cuff Size: Adult Regular   Pulse: 60  "  Weight: 192 lb 12.8 oz (87.5 kg)   Height: 6' 1\" (1.854 m)      Body mass index is 25.44 kg/m .      General Appearance:  Alert, afebrile, cooperative, no distress, appears stated age   HEENT:  Normal.  No acute findings.   Neck: Supple, symmetrical, no adenopathy.   Lungs:   Clear to auscultation bilaterally, respirations unlabored.  No expiratory wheeze or inspiratory crackles noted.   Heart:  Regular rate and rhythm, S1, S2 normal, no murmur, rub or gallop   Abdomen:   Soft, non-tender, positive bowel sounds, no masses, no organomegaly   Extremities:  Trace edema bilateral lower extremities.   Skin: Warm, dry.  Skin color, texture, turgor normal, no rashes or lesions   Neurologic:  Alert and oriented x3.  Grossly intact.       Most Recent EKG     Units 06/26/19  0924   VENTRATE BPM 70   ATRIALRATE BPM 80   QRSDURATION ms 156   QTINTERVAL ms 460   QTCCALC ms 496   RAXIS degrees 236   TAXIS degrees 40   MUSEDX  ** Suspect arm lead reversal, interpretation assumes no reversal  Wide QRS rhythm with frequent Premature ventricular complexes  Right bundle branch block  Inferior infarct , age undetermined  Anterolateral infarct , age undetermined  Abnormal ECG  When compared with ECG of 09-APR-2018 08:44,  Wide QRS rhythm has replaced Electronic ventricular pacemaker       Lab Results   Component Value Date    WBC 5.2 06/26/2019    HGB 13.8 (L) 06/26/2019    HCT 42.1 06/26/2019     (H) 06/26/2019     06/26/2019     Lab Results   Component Value Date    COLORU Yellow 06/26/2019    CLARITYU Slightly Cloudy (!) 06/26/2019    GLUCOSEU Negative 06/26/2019    BILIRUBINUR Negative 06/26/2019    KETONESU Negative 06/26/2019    SPECGRAV 1.020 06/26/2019    HGBUA Negative 06/26/2019    PHUR 6.0 06/26/2019    PROTEINUA Negative 06/26/2019    UROBILINOGEN 0.2 E.U./dL 06/26/2019    NITRITE Negative 06/26/2019    LEUKOCYTESUR Negative 06/26/2019       This note has been dictated using voice recognition software. Any " grammatical or context distortions are unintentional and inherent to the use of this software.

## 2021-05-30 NOTE — PROGRESS NOTES
Assessment/Plan:    1. Peripheral polyneuropathy  Ongoing symptoms of peripheral neuropathy.  Exam today is normal.  Had extensive work-up and evaluation for the symptoms roughly 6 months ago both in clinic, ED and with neurology and oncology.  No etiology found at that time.  We reviewed labs completed at patient's visit 1 month ago which were all normal.  Patient's inflammatory markers were unremarkable 6 months ago, will repeat again today to ensure stability.  Patient had head, cervical and lumbar spine CT completed in the fall 2018 which were also normal.  I recommended patient to follow-up with neurology.  We discussed the possibility of starting medication such as gabapentin to help with symptoms.  She prefers to hold off at this time until seen by neurology.  - CK Total  - C-Reactive Protein(CRP)  - Sedimentation Rate  - Vitamin B12  - Ambulatory referral to Neurology    2. Fatigue, unspecified type  Continued fatigue since visit last month.  EKG, chest x-ray and lab panel were all normal at that time.  He has no accompanying symptoms of shortness of breath or chest pain that would suggest cardiac etiology however we will have him follow-up with cardiology in approximately 1 month.  We will check a vitamin D level today.  We discussed red flag symptoms that would warrant immediate evaluation.  - CK Total  - C-Reactive Protein(CRP)  - Sedimentation Rate      Subjective:    Glenn Caballero is a 79 year old male seen today for a follow-up visit regarding fatigue and peripheral neuropathy.  Has a past medical history which includes atrial fibrillation with pacemaker in place, Cardiomyopathy, hypothyroidism, dyslipidemia, normocytic normochromic anemia, osteoarthritis.  Patient was seen in clinic roughly 1 month ago for evaluation of fatigue and symptoms of neuropathy in his feet and hands.  Denies any fevers, chills or other symptoms of illness.  EKG, chest x-ray and lab panel were all normal at that time.  He  continues to have symptoms of fatigue and has a visit scheduled with his cardiologist in about a month.  He is not having any chest pain, shortness of breath, PND, or lower extremity edema.  His peripheral neuropathy has possibly worsened over the last month.  This is been ongoing over the last 6 months at least.  He was evaluated here in clinic last fall for the symptoms.  Went to emergency department and had work-up with completed there.  He was eventually referred to neurology as well as oncology.  Had a head CT, cervical and lumbar spine CT which were normal.  EMG was negative.  No etiology was found.  His symptoms of neuropathy seem to get better after evaluation last fall until restarting 8 weeks or so ago.  He denies any weakness in his lower extremities.  No significant swelling.  Symptoms seem to get worse at the end of the day after being on his feet for long periods of time.  He denies any low back or sciatic pain associated with the paresthesias.  Describes that his legs falling asleep.  He denies taking any medication for the symptoms.  He is hesitant to start any medications that are not absolutely needed.  Review of systems is as stated in HPI, and the remainder of the 10 system review is otherwise unremarkable.    Past Medical History, Family History, and Social History reviewed.    No children  Tobacco: quit after teenager  EtOH: occ beer now and then  Mom -  85 diabetes  Dad -  MVA  2 bros -   3 sis - one  due to colon CA; youngest sis with end-stage leukemia  Surgeries: pacemaker; cardiac ablation  Hospitalizations: a. 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 Blanco I need your help,  I was taking Zolpidem 10 mg to help me sleep. It worked just fine. You switched  me to Temazepam, 15mg, it doesn't work for me, I wake up every two hours and can't get back to sleep. I took it on 4 different nights and slept very little. I would like to go back to Zolpidem. I have 2 left.    Past Surgical History:   Procedure Laterality Date     ABLATION OF DYSRHYTHMIC FOCUS       CARDIAC DEFIBRILLATOR PLACEMENT Left 01/2016    Medtronic     CARDIOVERSION  08/18/2016     INSERT / REPLACE / REMOVE PACEMAKER  7/14     [facility] ABLATE HEART DYSRHYTHM FOCUS      Description: Catheter Ablation Atrial Flutter;  Recorded: 07/11/2012;  Comments: 2005 for typical right isthmus dependent flutter     [facility] CARDIOVERSION ELECTIVE ARRHYTHMIA EXTERNAL  6/16/15; 7/13/15    Description: Elective Cardioversion External;  Recorded: 07/11/2012;  Comments: 5/18/12 for afib     pvi ablation  1/25/16        Family History   Problem Relation Age of Onset     Diabetes Mother      Colon cancer Sister      Cancer Sister         BLOOD CANCER        Past Medical History:   Diagnosis Date     Arrhythmia     Afib     Atrial fibrillation (H)      Atrial flutter (H)      Cardiomyopathy (H)      CHF (congestive heart failure) (H)      Disease of thyroid gland     Hypothyroid     Hyperlipidemia      Hypertension      Pacemaker      Peptic ulcer disease      Upper GI bleed     due to Duodenal ulcer from NSAID and Warfarin        Social History     Tobacco Use     Smoking status: Never Smoker     Smokeless tobacco: Never Used   Substance Use Topics     Alcohol use: Yes     Alcohol/week: 1.8 oz     Types: 3 Cans of beer per week     Drug use: No        Current Outpatient Medications   Medication Sig Dispense Refill     acetaminophen (TYLENOL) 500 MG tablet Take 1,000 mg by mouth every 6 (six) hours as needed for pain.       ascorbic acid (VITAMIN C) 500 MG tablet Take 500 mg by mouth 2 (two) times a day.       carvedilol (COREG) 25 MG tablet TAKE ONE TABLET BY MOUTH TWICE DAILY with meals 180 tablet 1     furosemide (LASIX) 20 MG tablet  Take 2 tablets (40 mg total) by mouth daily. 180 tablet 2     irbesartan (AVAPRO) 150 MG tablet TAKE ONE TABLET BY MOUTH ONE TIME DAILY 90 tablet 3     levothyroxine (SYNTHROID, LEVOTHROID) 125 MCG tablet Take 1 tablet (125 mcg total) by mouth every morning. 90 tablet 1     multivitamin Liqd solution Take 5 mL by mouth every morning.        rivaroxaban (XARELTO) 20 mg Tab Take 1 tablet (20 mg total) by mouth daily with supper. 90 tablet 2     zolpidem (AMBIEN) 10 mg tablet TAKE 1 TABLET BY MOUTH AT BEDTIME AS NEEDED FOR SLEEP 30 tablet 2     No current facility-administered medications for this visit.           Objective:    Vitals:    07/24/19 0849   BP: 138/62   Patient Site: Right Arm   Patient Position: Sitting   Cuff Size: Adult Regular   Pulse: 80   Weight: 190 lb 8 oz (86.4 kg)      Body mass index is 25.13 kg/m .      General Appearance:  Alert, cooperative, no distress, appears stated age   Neck: Supple, symmetrical, no adenopathy.   Lungs:   Clear to auscultation bilaterally, respirations unlabored.  No expiratory wheeze or inspiratory crackles noted.   Heart:  Regular rate and rhythm, S1, S2 normal, no murmur, rub or gallop   Extremities:  Trace edema bilateral lower extremities at baseline.  Extremities are otherwise normal.   Skin: Warm, dry.  Skin color, texture, turgor normal, no rashes or lesions   Neurologic:  Grossly intact on neuro exam today.  No focal deficits noted.  Equal strength, sensation and reflexes throughout.       This note has been dictated using voice recognition software. Any grammatical or context distortions are unintentional and inherent to the use of this software.

## 2021-05-30 NOTE — TELEPHONE ENCOUNTER
Controlled Substance Refill Request  Medication:   Requested Prescriptions     Pending Prescriptions Disp Refills     zolpidem (AMBIEN) 10 mg tablet [Pharmacy Med Name: Zolpidem Tartrate Oral Tablet 10 MG] 30 tablet 1     Sig: TAKE 1 TABLET BY MOUTH AT BEDTIME AS NEEDED FOR SLEEP     Date Last Fill: 5/6/19  [facility]: [id number]   Submit electronically to pharmacy  Controlled Substance Agreement on File:   Encounter-Level CSA Scan Date:    There are no encounter-level csa scan date.       Last office visit: Last office visit pertaining to requested medication was 7/24/19.

## 2021-05-30 NOTE — TELEPHONE ENCOUNTER
Remote check noted and reviewed.  Optival data reviewed with Temi Eng, patient may have slight fluid increase.  Phone call to patient.  Reviewed the below information with the patient, no changes noted on this remote check from previous.  Explained possible increase of fluid noted on Optivol.  He states that he has had swollen lower extremities for several months now, has not noted a significant increase in the last few weeks.  He states he is very careful with his diet and instructed him to continue to eat well and avoid excessive salt.  Also explained that patient should follow up with his PMD to review his symptoms of fatigue.  After discussion, patient states understanding.  He does have a recall to see Dr. Barboza this summer, will ask scheduling to contact the patient for an apt.

## 2021-05-31 VITALS — WEIGHT: 187 LBS | BODY MASS INDEX: 24.34 KG/M2

## 2021-06-01 VITALS — BODY MASS INDEX: 25.03 KG/M2 | WEIGHT: 195 LBS | HEIGHT: 74 IN

## 2021-06-01 NOTE — PROGRESS NOTES
[facility] Heart Care Note    Assessment:  Dilated congestive cardiomyopathy ejection fractions have been as low as 20%; more recently ejection fraction 35-37% by echocardiogram or nuclear scan         EF 39% Echocardiogram 5-2015        34 % by Echo        30-35% by echocardiogram September 15 2016       Ejection fraction 40%, echocardiogram November 8, 2018  Paroxysmal atrial fibrillation; prior CV=3       Cardioversion August 18 2016       Cardioversion 8-       Prior atrial flutter ablation         AF ablation 1--lots of atrial scar  Atrial fibrillation with rate control strategy May 2017  Sinus node dysfunction with AV block    Dual-chamber Medtronic pacemaker 7-  upgrade to CRTD 2- -Medtronic  No known coronary artery disease although nuclear scan did show a small fixed inferior lateral defect without reversibility-8/25/2014 ;   Normal coronary angiography 2005    Modest renal insufficiency with recent creatinine 1.4    Frequent PVCs   Amiodarone toxicity with pulmonary insufficiency-severe    Chronic anticoagulation with Xarelto   Prior GI bleed felt related to nonsteroidal anti-inflammatories      Plan:    Device interrogation today is satisfactory; battery should last another 2 years  We did EKG evaluation but did not feel any adjustments would be helpful  Battery should last another 2 years  Her major complaint is fatigue-I would recommend a sleep study although you do not feel that she is snore or have obstruction  Because of some fluid retention, I would start spironolactone 25 mg daily  Have a basic metabolic profile-blood test done with your primary physician in 2 weeks  Continue current medications  Have device check through transtelephonic monitoring every 3 months  Follow-up in 6 months for comprehensive cardiac arrhythmia device assessment      Subjective:    I had the opportunity to see.Glenn Caballero , who is a 79 y.o. male with a known history of  cardiomyopathy    Glenn is noticed rather profound fatigue.  Seems very tired just does not have much energy  Bony tries to do things he seems to have good endurance  Works out on a regular basis, golfs twice a week seems to have exercise tolerance for those activities  No chest pain  No excessive breathlessness  Notes occasions of pedal edema especially in the evening  Has undergone comprehensive evaluation for fatigue.  He is also undergone extensive neurologic evaluation is seen a neurologist on at least 3 occasions.  He had see CT scans of his cervical and back area and had a number of blood tests and pursuit of neuropathy  There was some concern of a gammopathy but this was ruled out by the hematologist    He takes Ambien 10 mg every night.  He has trouble sleeping because he feels the irregularity of his pulse when he tries to sleep    He is never had a sleep study  He feels that he does not snore, sleeps on his left side    BNP equals 332  TSH normal  CRP normal    CBC normal  Creatinine 1.19 with estimated GFR greater than 60  Potassium 4.6    ECG showed atrial fibrillation his intrinsic QRS is fairly narrow  With a ventricular biventricular pacing the QRS is wider than his intrinsic beats so we reprogrammed him VVI R  and though we do not have a high percentage of biventricular pacing (70% (his intrinsic beats are pretty narrow so this should not affect his overall synchrony    He does have frequent PVCs; he had a severe reaction amiodarone, did poorly on dronedarone            Problem List:  Patient Active Problem List   Diagnosis     Localized Primary Osteoarthritis Of The Right Shoulder     Persistent Atrial Fibrillation     Nonischemic cardiomyopathy (H)     Hypothyroidism     Dyslipidemia     Lump In / On The Skin     Ganglion     Chest pain     Insomnia     Esophageal reflux     Adverse Effect Of Amiodarone     Normochromic normocytic anemia     Presence of cardiac resynchronization therapy  defibrillator-Medtronic 3-lead     Family history of colon cancer     Fatigue, unspecified type     Bilateral leg edema     SOB (shortness of breath)     Vitamin D deficiency     Peripheral polyneuropathy     Medical History:  Past Medical History:   Diagnosis Date     Arrhythmia     Afib     Atrial fibrillation (H)      Atrial flutter (H)      Cardiomyopathy (H)      CHF (congestive heart failure) (H)      Disease of thyroid gland     Hypothyroid     Hyperlipidemia      Hypertension      Pacemaker      Peptic ulcer disease      Upper GI bleed     due to Duodenal ulcer from NSAID and Warfarin     Surgical History:  Past Surgical History:   Procedure Laterality Date     ABLATION OF DYSRHYTHMIC FOCUS       CARDIAC DEFIBRILLATOR PLACEMENT Left 01/2016    Medtronic     CARDIOVERSION  08/18/2016     INSERT / REPLACE / REMOVE PACEMAKER  7/14     [facility] ABLATE HEART DYSRHYTHM FOCUS      Description: Catheter Ablation Atrial Flutter;  Recorded: 07/11/2012;  Comments: 2005 for typical right isthmus dependent flutter     [facility] CARDIOVERSION ELECTIVE ARRHYTHMIA EXTERNAL  6/16/15; 7/13/15    Description: Elective Cardioversion External;  Recorded: 07/11/2012;  Comments: 5/18/12 for afib     pvi ablation  1/25/16     Social History:  Social History     Socioeconomic History     Marital status:      Spouse name: Not on file     Number of children: Not on file     Years of education: Not on file     Highest education level: Not on file   Occupational History     Not on file   Social Needs     Financial resource strain: Not on file     Food insecurity:     Worry: Not on file     Inability: Not on file     Transportation needs:     Medical: Not on file     Non-medical: Not on file   Tobacco Use     Smoking status: Never Smoker     Smokeless tobacco: Never Used   Substance and Sexual Activity     Alcohol use: Yes     Alcohol/week: 1.8 oz     Types: 3 Cans of beer per week     Drug use: No     Sexual activity: Not on file      Comment: SINGLE    Lifestyle     Physical activity:     Days per week: Not on file     Minutes per session: Not on file     Stress: Not on file   Relationships     Social connections:     Talks on phone: Not on file     Gets together: Not on file     Attends Jew service: Not on file     Active member of club or organization: Not on file     Attends meetings of clubs or organizations: Not on file     Relationship status: Not on file     Intimate partner violence:     Fear of current or ex partner: Not on file     Emotionally abused: Not on file     Physically abused: Not on file     Forced sexual activity: Not on file   Other Topics Concern     Not on file   Social History Narrative     Not on file       Review of Systems:      General: WNL  Eyes: WNL  Ears/Nose/Throat: WNL  Lungs: WNL  Heart: Leg Swelling  Stomach: WNL  Bladder: WNL  Muscle/Joints: WNL  Skin: WNL  Nervous System: WNL  Mental Health: WNL     Blood: WNL        Family History:  Family History   Problem Relation Age of Onset     Diabetes Mother      Colon cancer Sister      Cancer Sister         BLOOD CANCER         Allergies:  Allergies   Allergen Reactions     Amiodarone Other (See Comments)     Pulmonary toxicity     Amoxicillin Anaphylaxis     Multaq [Dronedarone] Other (See Comments)     Pt had similar response to Multaq as Amiodarone, c/o SOB and difficulty breathing       Medications:  Current Outpatient Medications   Medication Sig Dispense Refill     acetaminophen (TYLENOL) 500 MG tablet Take 1,000 mg by mouth every 6 (six) hours as needed for pain.       ascorbic acid (VITAMIN C) 500 MG tablet Take 500 mg by mouth 2 (two) times a day.       carvedilol (COREG) 25 MG tablet TAKE ONE TABLET BY MOUTH TWICE DAILY with meals 180 tablet 1     furosemide (LASIX) 20 MG tablet Take 2 tablets (40 mg total) by mouth daily. 180 tablet 2     irbesartan (AVAPRO) 150 MG tablet TAKE ONE TABLET BY MOUTH ONE TIME DAILY 90 tablet 3     levothyroxine  (SYNTHROID, LEVOTHROID) 125 MCG tablet Take 1 tablet (125 mcg total) by mouth every morning. 90 tablet 1     multivitamin Liqd solution Take 5 mL by mouth every morning.        rivaroxaban (XARELTO) 20 mg Tab Take 1 tablet (20 mg total) by mouth daily with supper. 90 tablet 2     zolpidem (AMBIEN) 10 mg tablet TAKE 1 TABLET BY MOUTH AT BEDTIME AS NEEDED FOR SLEEP 30 tablet 2     No current facility-administered medications for this visit.          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  HEENT: No scleral icterus. No Xanthelasma. Oral mucous membranes pink and moist.  NECK: JVP veins are mildly distended with a positive hepatojugular reflux . Thyroid not  Palpable  CHEST: clear to auscultation and percussion  CARDIOVASCULAR: S1, S2 without murmur    Brachial, radial  pulses are intact and symmetric.   No carotid bruits noted.  ABDOMEN: Non tender. BS+. No bruits.  EXTREMITIES: No cyanosis, clubbing or edema.    Lab Results:  LIPIDS:  Lab Results   Component Value Date    CHOL 221 (H) 07/07/2017    CHOL 173 06/24/2014    CHOL 225 (H) 01/13/2014     Lab Results   Component Value Date    HDL 51 07/07/2017    HDL 52 06/24/2014    HDL 60  01/13/2014     Lab Results   Component Value Date    LDLCALC 141 (H) 07/07/2017    LDLCALC 106 06/24/2014    LDLCALC 143 (H) 01/13/2014     Lab Results   Component Value Date    TRIG 147 07/07/2017    TRIG 74 06/24/2014    TRIG 108 01/13/2014     No components found for: CHOLHDL    BMP:  Lab Results   Component Value Date    CREATININE 1.19 06/26/2019    BUN 20 06/26/2019     06/26/2019    K 4.6 06/26/2019     06/26/2019    CO2 26 06/26/2019         This note has been dictated using voice recognition software. Any grammatical or context distortions are unintentional and inherent to the software.  Orlin Barboza MD  [facility]  [phone]

## 2021-06-01 NOTE — PATIENT INSTRUCTIONS - HE
Glenn Caballero    Thank you for coming to the [facility] today for a cardiac evaluation  It was my pleasure to see you today  A good contact for any questions would be Kajal Marie  RN @ [phone]    Device interrogation today is satisfactory; battery should last another 2 years  We did EKG evaluation but did not feel any adjustments would be helpful  Battery should last another 2 years  Her major complaint is fatigue-I would recommend a sleep study although you do not feel that she is snore or have obstruction  Because of some fluid retention, I would start spironolactone 25 mg daily  Have a basic metabolic profile-blood test done with your primary physician in 2 weeks  Continue current medications  Have device check through transtelephonic monitoring every 3 months  Follow-up in 6 months for comprehensive cardiac arrhythmia device assessment

## 2021-06-01 NOTE — PROGRESS NOTES
"Assessment/Plan:    1. Muscle spasm  Patient's history of present illness and findings on exam are most consistent with musculoskeletal etiology.  Abdominal exam is benign today and I reassured patient that symptoms will likely improve with time alone.  For safe measures, will check kidney and liver function along with a lipase and CBC to rule out other potential etiologies.  Will follow-up with patient regarding results when available.  In the meantime, recommend that he continue to monitor for any new or worsening symptoms and follow-up in clinic if symptoms fail to improve.  - Comprehensive Metabolic Panel  - Lipase  - HM2(CBC w/o Differential)      Subjective:    Glenn Caballero is a 79-year-old male seen today for evaluation of muscle spasms in his right upper quadrant, right below his right rib cage. 2 weeks ago he started noticing a \"twinge\" or spasm feeling in the right upper quadrant right below his rib cage.  He denies any injury to the area.  There is no pain or discomfort but rather a noticeable spasm-like feeling.  This happened several times throughout the day.  Symptoms last for about 15 seconds at a time before resolving on their own.  He denies any pain around his rib cage.  No recent upper respiratory tract illness or fevers, chills, or other systemic symptoms.  No abdominal pain elsewhere.  Patient's friend told him it could be a gallbladder issue so he wanted to follow-up in clinic today.  He otherwise feels well overall.  Of note he had a recent follow-up visit with his cardiologist with a device check for his pacemaker at that time.  Cardiology felt that everything was going well overall.  Denies any other symptoms of concern today. Review of systems is as stated in HPI, and the remainder of the 10 system review is otherwise unremarkable.    Past Medical History, Family History, and Social History reviewed.    Past Surgical History:   Procedure Laterality Date     ABLATION OF DYSRHYTHMIC FOCUS   "     CARDIAC DEFIBRILLATOR PLACEMENT Left 01/2016    [employer]     CARDIOVERSION  08/18/2016     INSERT / REPLACE / REMOVE PACEMAKER  7/14     MT ABLATE HEART DYSRHYTHM FOCUS      Description: Catheter Ablation Atrial Flutter;  Recorded: 07/11/2012;  Comments: 2005 for typical right isthmus dependent flutter     MT CARDIOVERSION ELECTIVE ARRHYTHMIA EXTERNAL  6/16/15; 7/13/15    Description: Elective Cardioversion External;  Recorded: 07/11/2012;  Comments: 5/18/12 for afib     pvi ablation  1/25/16        Family History   Problem Relation Age of Onset     Diabetes Mother      Colon cancer Sister      Cancer Sister         BLOOD CANCER        Past Medical History:   Diagnosis Date     Arrhythmia     Afib     Atrial fibrillation (H)      Atrial flutter (H)      Cardiomyopathy (H)      CHF (congestive heart failure) (H)      Disease of thyroid gland     Hypothyroid     Hyperlipidemia      Hypertension      Pacemaker      Peptic ulcer disease      Upper GI bleed     due to Duodenal ulcer from NSAID and Warfarin        Social History     Tobacco Use     Smoking status: Never Smoker     Smokeless tobacco: Never Used   Substance Use Topics     Alcohol use: Yes     Alcohol/week: 1.8 oz     Types: 3 Cans of beer per week     Drug use: No        Current Outpatient Medications   Medication Sig Dispense Refill     acetaminophen (TYLENOL) 500 MG tablet Take 1,000 mg by mouth every 6 (six) hours as needed for pain.       ascorbic acid (VITAMIN C) 500 MG tablet Take 500 mg by mouth 2 (two) times a day.       carvedilol (COREG) 25 MG tablet TAKE ONE TABLET BY MOUTH TWICE DAILY with meals 180 tablet 1     furosemide (LASIX) 20 MG tablet Take 2 tablets (40 mg total) by mouth daily. 180 tablet 2     irbesartan (AVAPRO) 150 MG tablet TAKE ONE TABLET BY MOUTH ONE TIME DAILY 90 tablet 3     levothyroxine (SYNTHROID, LEVOTHROID) 125 MCG tablet Take 1 tablet (125 mcg total) by mouth every morning. 90 tablet 1     multivitamin Liqd solution  Take 5 mL by mouth every morning.        rivaroxaban (XARELTO) 20 mg Tab Take 1 tablet (20 mg total) by mouth daily with supper. 90 tablet 2     spironolactone (ALDACTONE) 25 MG tablet Take 1 tablet (25 mg total) by mouth daily. 90 tablet 5     zolpidem (AMBIEN) 10 mg tablet TAKE 1 TABLET BY MOUTH AT BEDTIME AS NEEDED FOR SLEEP 30 tablet 2     No current facility-administered medications for this visit.           Objective:    Vitals:    09/19/19 1121   BP: 110/62   Patient Site: Right Arm   Patient Position: Sitting   Cuff Size: Adult Regular   Pulse: 88   Weight: 186 lb 6.4 oz (84.6 kg)   Height: 6' (1.829 m)      Body mass index is 25.28 kg/m .      General Appearance:  Alert, cooperative, no distress, appears stated age   Lungs:   Clear to auscultation bilaterally, respirations unlabored.  No expiratory wheeze or inspiratory crackles noted.   Heart:  Regular rate and rhythm, S1, S2 normal, no murmur, rub or gallop   Abdomen:    Back:   Soft, non-tender, positive bowel sounds, no masses, no organomegaly  No flank pain.   Extremities: Extremities normal.  No cyanosis or edema   Skin: Warm, dry.  Skin color, texture, turgor normal, no rashes or lesions   Neurologic:  Grossly intact.         This note has been dictated using voice recognition software. Any grammatical or context distortions are unintentional and inherent to the use of this software.

## 2021-06-01 NOTE — PROGRESS NOTES
In clinic device check with Dr. Barboza.  Please see link for full device report.  Patient was informed of results and next follow up during today's visit.

## 2021-06-02 VITALS — WEIGHT: 190 LBS | HEIGHT: 73 IN | BODY MASS INDEX: 25.18 KG/M2

## 2021-06-02 VITALS — WEIGHT: 192 LBS | BODY MASS INDEX: 25.33 KG/M2

## 2021-06-02 VITALS — WEIGHT: 192 LBS | HEIGHT: 73 IN | BODY MASS INDEX: 25.45 KG/M2

## 2021-06-02 NOTE — TELEPHONE ENCOUNTER
Controlled Substance Refill Request  Medication:   Requested Prescriptions     Pending Prescriptions Disp Refills     zolpidem (AMBIEN) 10 mg tablet [Pharmacy Med Name: Zolpidem Tartrate Oral Tablet 10 MG] 30 tablet 1     Sig: TAKE 1 TABLET BY MOUTH AT BEDTIME AS NEEDED FOR SLEEP     Date Last Fill: 7/30/19  [facility]: [id number]   Submit electronically to pharmacy  Controlled Substance Agreement on File:   Encounter-Level CSA Scan Date:    There are no encounter-level csa scan date.       Last office visit: Last office visit pertaining to requested medication was 9/19/19.

## 2021-06-03 VITALS
DIASTOLIC BLOOD PRESSURE: 72 MMHG | HEART RATE: 72 BPM | BODY MASS INDEX: 25.07 KG/M2 | SYSTOLIC BLOOD PRESSURE: 118 MMHG | RESPIRATION RATE: 16 BRPM | WEIGHT: 190 LBS

## 2021-06-03 VITALS
DIASTOLIC BLOOD PRESSURE: 62 MMHG | HEART RATE: 88 BPM | SYSTOLIC BLOOD PRESSURE: 110 MMHG | WEIGHT: 186.4 LBS | HEIGHT: 72 IN | BODY MASS INDEX: 25.25 KG/M2

## 2021-06-03 VITALS
DIASTOLIC BLOOD PRESSURE: 62 MMHG | HEART RATE: 80 BPM | SYSTOLIC BLOOD PRESSURE: 128 MMHG | BODY MASS INDEX: 26 KG/M2 | WEIGHT: 191.7 LBS

## 2021-06-03 VITALS — BODY MASS INDEX: 25.55 KG/M2 | HEIGHT: 73 IN | WEIGHT: 192.8 LBS

## 2021-06-03 VITALS — WEIGHT: 190.5 LBS | BODY MASS INDEX: 25.13 KG/M2

## 2021-06-03 NOTE — PROGRESS NOTES
Assessment/Plan:    1. RUQ abdominal pain  Right upper quadrant discomfort described as more of a spasm than a pain.  Given the persistence of symptoms along with fatigue, I think it would be reasonable to obtain an abdominal CT to ensure that there is no underlying cause of this.  He has had normal labs and we reviewed these.  He does not feel the need to repeat them today.  I did recommend that he schedule an annual exam within the next 3 months or so.  We will follow-up with patient regarding results of abdominal CT when available.  In the meantime, we discussed red flag symptoms that would warrant immediate evaluation.  - CT Abdomen Pelvis With Oral With IV Contrast; Future    2. Chronic fatigue  As above, reviewed labs which have all been stable.  No obvious reason for the fatigue.  Will obtain abdominal CT as noted.  - CT Abdomen Pelvis With Oral With IV Contrast; Future    3. Conjunctivitis of both eyes, unspecified conjunctivitis type  Refill provided for Tobrex ophthalmic drops.  We discussed importance of being evaluated in clinic with any new symptoms.  - tobramycin (TOBREX) 0.3 % ophthalmic solution; 1-2 drops very four hours as needed  Dispense: 5 mL; Refill: 0      Subjective:    Glenn Caballero is a 79 year old male seen today for follow-up evaluation of muscle spasm and fatigue.  Past medical history includes atrial fibrillation, nonischemic cardiomyopathy, hypothyroidism, dyslipidemia reflux, anemia, vitamin D deficiency.  Patient has been experiencing muscle spasms in his right rib cage area for the last year or so.  Labs and chest x-ray were all normal.  He has been seeing an acupuncturist which he feels has helped manage the spasms.  He feels that the symptoms have reduced by about half since starting acupuncture.  He continues to feel fatigued and this is been going on for several months.  He has been seen by his cardiologist who does not feel that it is a cardiology issue.  He was recently  started on spironolactone after having some worsening lower extremity edema.  He stopped taking this due to unwanted side effects.  He feels that the fatigue has also improved somewhat but is still present and he feels that there should be an underlying cause for this.  His labs have remained stable.  He denies having any new symptoms since being evaluated for fatigue initially.  He is not having significant pain in his abdomen just the occasional spasm feeling.  He has been taking vitamin B and vitamin D supplements after having a mildly reduced vitamin D level.  He feels that this has been helping with the fatigue as well.  He is concerned about something more going on given the ongoing fatigue.  He denies any change in his bowel movements.  No flank pain or urinary symptoms.  He denies systemic symptoms of fevers, chills, myalgias.  No chest pain or shortness of breath.     She is requesting a refill on his eyedrops.  States that he tends to get infections and has been prescribed tobramycin eyedrops in the past.  He likes to have this on hand for when he develops these infections.  He denies having symptoms currently.  Review of systems is as stated in HPI, and the remainder of the 10 system review is otherwise unremarkable.    Past Medical History, Family History, and Social History reviewed.    Past Surgical History:   Procedure Laterality Date     ABLATION OF DYSRHYTHMIC FOCUS       CARDIAC DEFIBRILLATOR PLACEMENT Left 01/2016    [employer]     CARDIOVERSION  08/18/2016     INSERT / REPLACE / REMOVE PACEMAKER  7/14     [place] ABLATE HEART DYSRHYTHM FOCUS      Description: Catheter Ablation Atrial Flutter;  Recorded: 07/11/2012;  Comments: 2005 for typical right isthmus dependent flutter     PA CARDIOVERSION ELECTIVE ARRHYTHMIA EXTERNAL  6/16/15; 7/13/15    Description: Elective Cardioversion External;  Recorded: 07/11/2012;  Comments: 5/18/12 for afib     pvi ablation  1/25/16        Family History   Problem  Relation Age of Onset     Diabetes Mother      Colon cancer Sister      Cancer Sister         BLOOD CANCER        Past Medical History:   Diagnosis Date     Arrhythmia     Afib     Atrial fibrillation (H)      Atrial flutter (H)      Cardiomyopathy (H)      CHF (congestive heart failure) (H)      Disease of thyroid gland     Hypothyroid     Hyperlipidemia      Hypertension      Pacemaker      Peptic ulcer disease      Upper GI bleed     due to Duodenal ulcer from NSAID and Warfarin        Social History     Tobacco Use     Smoking status: Never Smoker     Smokeless tobacco: Never Used   Substance Use Topics     Alcohol use: Yes     Alcohol/week: 3.0 standard drinks     Types: 3 Cans of beer per week     Drug use: No        Current Outpatient Medications   Medication Sig Dispense Refill     acetaminophen (TYLENOL) 500 MG tablet Take 1,000 mg by mouth every 6 (six) hours as needed for pain.       ascorbic acid (VITAMIN C) 500 MG tablet Take 500 mg by mouth 2 (two) times a day.       carvedilol (COREG) 25 MG tablet TAKE ONE TABLET BY MOUTH TWICE DAILY with meals 180 tablet 2     furosemide (LASIX) 20 MG tablet Take 2 tablets (40 mg total) by mouth daily. 180 tablet 2     irbesartan (AVAPRO) 150 MG tablet TAKE ONE TABLET BY MOUTH ONE TIME DAILY 90 tablet 3     levothyroxine (SYNTHROID, LEVOTHROID) 125 MCG tablet Take 1 tablet (125 mcg total) by mouth every morning. 90 tablet 3     multivitamin Liqd solution Take 5 mL by mouth every morning.        spironolactone (ALDACTONE) 25 MG tablet Take 1 tablet (25 mg total) by mouth daily. 90 tablet 5     XARELTO 20 mg tablet Take 1 tablet (20 mg total) by mouth daily with supper. 90 tablet 1     zolpidem (AMBIEN) 10 mg tablet TAKE 1 TABLET BY MOUTH AT BEDTIME AS NEEDED FOR SLEEP 30 tablet 2     tobramycin (TOBREX) 0.3 % ophthalmic solution 1-2 drops very four hours as needed 5 mL 0     No current facility-administered medications for this visit.           Objective:    Vitals:     11/12/19 1353   BP: 128/62   Patient Site: Left Arm   Patient Position: Sitting   Cuff Size: Adult Regular   Pulse: 80   Weight: 191 lb 11.2 oz (87 kg)      Body mass index is 26 kg/m .      General Appearance:  Alert, cooperative, no distress, appears stated age   HEENT:  Normal.  No acute findings.   Neck: Supple, symmetrical, no adenopathy.   Lungs:   Clear to auscultation bilaterally, respirations unlabored.  No expiratory wheeze or inspiratory crackles noted.   Heart:  Regular rate and rhythm, S1, S2 normal, no murmur, rub or gallop   Abdomen:    Flank:   Soft, non-tender, positive bowel sounds, no masses, no organomegaly  No CVA tenderness.   Extremities: Extremities normal.  No cyanosis or edema   Skin: Warm, dry.  Skin color, texture, turgor normal, no rashes or lesions   Neurologic:  Grossly intact.         This note has been dictated using voice recognition software. Any grammatical or context distortions are unintentional and inherent to the use of this software.

## 2021-06-04 VITALS
WEIGHT: 193.6 LBS | SYSTOLIC BLOOD PRESSURE: 122 MMHG | BODY MASS INDEX: 26.26 KG/M2 | DIASTOLIC BLOOD PRESSURE: 82 MMHG | HEART RATE: 79 BPM

## 2021-06-04 VITALS
BODY MASS INDEX: 26.45 KG/M2 | HEART RATE: 94 BPM | OXYGEN SATURATION: 98 % | SYSTOLIC BLOOD PRESSURE: 124 MMHG | DIASTOLIC BLOOD PRESSURE: 70 MMHG | WEIGHT: 195 LBS

## 2021-06-04 VITALS
SYSTOLIC BLOOD PRESSURE: 110 MMHG | WEIGHT: 196 LBS | HEART RATE: 82 BPM | BODY MASS INDEX: 26.58 KG/M2 | DIASTOLIC BLOOD PRESSURE: 72 MMHG

## 2021-06-04 VITALS
HEART RATE: 84 BPM | WEIGHT: 196 LBS | SYSTOLIC BLOOD PRESSURE: 104 MMHG | BODY MASS INDEX: 26.58 KG/M2 | DIASTOLIC BLOOD PRESSURE: 72 MMHG

## 2021-06-04 VITALS
HEART RATE: 81 BPM | WEIGHT: 195 LBS | DIASTOLIC BLOOD PRESSURE: 70 MMHG | SYSTOLIC BLOOD PRESSURE: 107 MMHG | BODY MASS INDEX: 26.45 KG/M2

## 2021-06-04 VITALS — SYSTOLIC BLOOD PRESSURE: 124 MMHG | DIASTOLIC BLOOD PRESSURE: 81 MMHG | WEIGHT: 189 LBS | BODY MASS INDEX: 25.63 KG/M2

## 2021-06-04 VITALS
DIASTOLIC BLOOD PRESSURE: 71 MMHG | HEART RATE: 67 BPM | SYSTOLIC BLOOD PRESSURE: 124 MMHG | BODY MASS INDEX: 26.45 KG/M2 | WEIGHT: 195 LBS

## 2021-06-05 NOTE — TELEPHONE ENCOUNTER
Controlled Substance Refill Request  Medication Name:   Requested Prescriptions     Pending Prescriptions Disp Refills     zolpidem (AMBIEN) 10 mg tablet [Pharmacy Med Name: Zolpidem Tartrate Oral Tablet 10 MG] 30 tablet 1     Sig: TAKE 1 TABLET BY MOUTH AT BEDTIME AS NEEDED FOR SLEEP     Date Last Fill:   zolpidem (AMBIEN) 10 mg tablet 30 tablet 2 10/23/2019  No   Sig: TAKE 1 TABLET BY MOUTH AT BEDTIME AS NEEDED FOR SLEEP   Sent to pharmacy as: zolpidem 10 mg tablet (AMBIEN)   E-Prescribing Status: Receipt confirmed by pharmacy (10/23/2019  5:10 PM CDT)   [facility]ed [facility]  Submit electronically to pharmacy  Controlled Substance Agreement on file:   Encounter-Level CSA Scan Date:    There are no encounter-level csa scan date.        Last office visit:  11/12/19

## 2021-06-06 NOTE — PROGRESS NOTES
Assessment/Plan:    1. SOB (shortness of breath)  Chest x-ray is negative for infiltrate or any acute changes.  This is reassuring.  EKG is stable overall.  Given his symptoms of shortness of breath and fatigue along with history, will refer for echo stress test.  I also strongly encouraged him to schedule follow-up visit with his cardiologist.  We discussed red flag symptoms that would warrant immediate evaluation in the ED.  Patient understands and is comfortable with this plan.  - XR Chest 2 Views  - Electrocardiogram Perform and Read  - Echo Stress Exercise; Future    2. Fatigue, unspecified type  As above, no sign of pneumonia or other new significant findings on chest x-ray today.  EKG also stable overall.  Will refer for stress echo and follow-up with cardiology.  - XR Chest 2 Views  - Electrocardiogram Perform and Read  - Echo Stress Exercise; Future    3. Anxiety  We discussed medication options for treatment of anxiety including SSRIs versus PRN medications.  Patient is interested in starting a daily medication.  Will initiate Celexa 10 mg daily.  Discussed possible side effects of medication to monitor for.  Will provide hydroxyzine to use as needed for panic attacks in the meantime.  I recommend close follow-up in 4 weeks to reassess symptoms and make any medication adjustments at that time.  He should follow-up sooner with any new or worsening symptoms.  - citalopram (CELEXA) 10 MG tablet; Take 1 tablet (10 mg total) by mouth daily.  Dispense: 30 tablet; Refill: 2  - hydrOXYzine pamoate (VISTARIL) 50 MG capsule; Take 1 capsule (50 mg total) by mouth 3 (three) times a day as needed for itching.  Dispense: 90 capsule; Refill: 0    4. Muscle spasm  Improved with use of inversion table and stretching.  Patient will continue to monitor.    5. Atrial fibrillation, unspecified type (H)  History of atrial fibrillation noted.  He remains anticoagulated on Xarelto.    6. Nonischemic cardiomyopathy (H)  7.   Congestive heart failure  Reviewed history of nonischemic cardiomyopathy and congestive heart failure.  He remains on irbesartan, furosemide and carvedilol.  Patient is euvolemic on exam without sign of volume overload.  He is due for follow-up with cardiology and encouraged him to schedule this given recent symptoms of shortness of breath and fatigue.  Will obtain stress echo test.    7. Hypothyroidism, unspecified type  Continue levothyroxine 125 MCG daily.  - Will check thyroid cascade to ensure stability    Subjective:    Glenn Caballero is a 79 year old male seen today with several concerns.  Patient states that the muscle spasms in the right side of his chest that he has been experiencing off and on for the last couple of years has been much improved after he has been doing inversion table.  He is not having episodes as frequently and states that it is rare that he has any spasms anymore.  Stretching is also helping with this.  He continues to feel very fatigued.  He does have atrial fibrillation and is due for a device check within the next couple weeks.  He does wonder if this could be a cardiac issue.  He does feel more short of breath with activity.  He denies having any pain in his chest.  No lower extremity edema.  Denies any heart palpitations, dizziness or syncope.  He has history of nonischemic cardiomyopathy and follows with cardiology.  He is planning on making a follow-up visit with cardiology in the near future.  Patient has had cold-like symptoms over the last couple of weeks.  He has had some nasal drainage and coughing.  Feels that his shortness of breath may be attributed to that.  He denies having any fevers, chills, body aches.  He is more fatigued which is been going on for several months now.  Does not feel that this is related to his sleep and states that he is sleeping normally for the most part.  His main concern today is in regards to increased anxiety.  Patient is planning to move to  [place] and is trying to get his house ready to sell.  This is been causing a lot of stress.  He feels that this may also be contributing to some of his symptoms of shortness of breath and fatigue.  He is interested in starting medication for his anxiety.  He denies any depression or thoughts of harming himself or others.  He has had symptoms of a panic attack.  These thoughts occasionally keep him up at night.  Review of systems is as stated in HPI, and the remainder of the 10 system review is otherwise unremarkable.    Past Medical History, Family History, and Social History reviewed.    Past Surgical History:   Procedure Laterality Date     ABLATION OF DYSRHYTHMIC FOCUS       CARDIAC DEFIBRILLATOR PLACEMENT Left 01/2016    [employer]     CARDIOVERSION  08/18/2016     INSERT / REPLACE / REMOVE PACEMAKER  7/14     SC ABLATE HEART DYSRHYTHM FOCUS      Description: Catheter Ablation Atrial Flutter;  Recorded: 07/11/2012;  Comments: 2005 for typical right isthmus dependent flutter     SC CARDIOVERSION ELECTIVE ARRHYTHMIA EXTERNAL  6/16/15; 7/13/15    Description: Elective Cardioversion External;  Recorded: 07/11/2012;  Comments: 5/18/12 for afib     pvi ablation  1/25/16        Family History   Problem Relation Age of Onset     Diabetes Mother      Colon cancer Sister      Cancer Sister         BLOOD CANCER        Past Medical History:   Diagnosis Date     Arrhythmia     Afib     Atrial fibrillation (H)      Atrial flutter (H)      Cardiomyopathy (H)      CHF (congestive heart failure) (H)      Disease of thyroid gland     Hypothyroid     Hyperlipidemia      Hypertension      Pacemaker      Peptic ulcer disease      Upper GI bleed     due to Duodenal ulcer from NSAID and Warfarin        Social History     Tobacco Use     Smoking status: Never Smoker     Smokeless tobacco: Never Used   Substance Use Topics     Alcohol use: Yes     Alcohol/week: 3.0 standard drinks     Types: 3 Cans of beer per week     Drug use: No         Current Outpatient Medications   Medication Sig Dispense Refill     acetaminophen (TYLENOL) 500 MG tablet Take 1,000 mg by mouth every 6 (six) hours as needed for pain.       ascorbic acid (VITAMIN C) 500 MG tablet Take 500 mg by mouth 2 (two) times a day.       carvedilol (COREG) 25 MG tablet TAKE ONE TABLET BY MOUTH TWICE DAILY with meals 180 tablet 2     furosemide (LASIX) 40 MG tablet Take 1 tablet (40 mg total) by mouth daily. 90 tablet 1     irbesartan (AVAPRO) 150 MG tablet Take 1 tablet (150 mg total) by mouth daily. 90 tablet 2     levothyroxine (SYNTHROID, LEVOTHROID) 125 MCG tablet Take 1 tablet (125 mcg total) by mouth every morning. 90 tablet 3     multivitamin Liqd solution Take 5 mL by mouth every morning.        XARELTO 20 mg tablet Take 1 tablet (20 mg total) by mouth daily with supper. 90 tablet 1     zolpidem (AMBIEN) 10 mg tablet TAKE 1 TABLET BY MOUTH AT BEDTIME AS NEEDED FOR SLEEP 30 tablet 1     citalopram (CELEXA) 10 MG tablet Take 1 tablet (10 mg total) by mouth daily. 30 tablet 2     hydrOXYzine pamoate (VISTARIL) 50 MG capsule Take 1 capsule (50 mg total) by mouth 3 (three) times a day as needed for itching. 90 capsule 0     spironolactone (ALDACTONE) 25 MG tablet Take 1 tablet (25 mg total) by mouth daily. 90 tablet 5     No current facility-administered medications for this visit.           Objective:    Vitals:    02/18/20 0829   BP: 124/70   Patient Site: Right Arm   Patient Position: Sitting   Cuff Size: Adult Large   Pulse: 94   SpO2: 98%   Weight: 195 lb (88.5 kg)      Body mass index is 26.45 kg/m .      General Appearance:  Alert, cooperative, no distress, appears stated age   HEENT:  Normal.  No acute findings.   Neck: Supple, symmetrical, no adenopathy.  No thyromegaly.   Lungs:   Clear to auscultation bilaterally, respirations unlabored.  No expiratory wheeze or inspiratory crackles noted.   Heart:  Regular rate and rhythm, S1, S2 normal, no murmur, rub or gallop   Abdomen:    Soft, non-tender, positive bowel sounds, no masses, no organomegaly   Extremities: Extremities normal.  No cyanosis or edema   Skin: Warm, dry.  Skin color, texture, turgor normal, no rashes or lesions   Neurologic:  Grossly intact without any focal deficits noted        EXAM: XR CHEST 2 VIEWS  LOCATION: [facility]  DATE/TIME: 2/18/2020 9:50 AM     INDICATION: Shortness of breath  COMPARISON: 06/26/2019 and older studies.     IMPRESSION:   Multi lead left subclavian pacer. Cardiomegaly is unchanged. No signs of failure or effusions. Lungs are clear.      Echo from Nov 2018:  Summary       Severe left atrial enlargement    Left ventricle ejection fraction is moderately decreased. The calculated left ventricular ejection fraction is 40%. There is global hypokinesis with akinesis of the mid to basal inferior wall    Normal right ventricular size with decreased systolic function.    Mild mitral and tricuspid regurgitation. No evidence of pulmonary hypertension    When compared to the previous study dated 9/15/2016, the mid to basal inferior wall appears akinetic on the current study.          This note has been dictated using voice recognition software. Any grammatical or context distortions are unintentional and inherent to the use of this software.

## 2021-06-06 NOTE — PROGRESS NOTES
Patient came in for stress echo today that was cancelled.  Resting echo showed decreased function from previous echo.  Cardiologist, Dr. Nevarez, was consulted and test was deemed inappropriate due to a decreased function and pacemaker.  Test was changed to an echocardiogram per cardiologist.  Patient encouraged to schedule a follow up with his cardiologist.

## 2021-06-07 NOTE — PROGRESS NOTES
"Glenn Caballero is a 79 y.o. male who is being evaluated via a billable telephone visit.      The patient has been notified of following:     \"This telephone visit will be conducted via a call between you and your physician/provider. We have found that certain health care needs can be provided without the need for a physical exam.  This service lets us provide the care you need with a short phone conversation.  If a prescription is necessary we can send it directly to your pharmacy.  If lab work is needed we can place an order for that and you can then stop by our lab to have the test done at a later time.    Telephone visits are billed at different rates depending on your insurance coverage. During this emergency period, for some insurers they may be billed the same as an in-person visit.  Please reach out to your insurance provider with any questions.    If during the course of the call the physician/provider feels a telephone visit is not appropriate, you will not be charged for this service.\"    Patient has given verbal consent to a Telephone visit? Yes    Patient would like to receive their AVS by AVS Preference: Mail a copy.    Additional provider notes:  patient was begun on a new diuretic in preparation for a new congestive heart failure medication by his cardiologist he is unsure of the name but based on the clinical pattern of developing a rash 7 days later sounds like a drug reaction.  He was instructed to discontinue this medication.  I based on the COVID restrictions have instructed him to compound a OTC bottle of Benadryl lotion and hydrocortisone cream to shake it applied twice daily.  To take cornstarch baths.  This should provide relief from his urticaria.  He does have some antihistamine tablets at home which he may take for severe itching specifically hydroxyzine.  He will let his cardiologist know about the medication.  He will call us if he develops any problems swallowing.  Time as noted system " review otherwise unremarkable chart was scanned recent laboratory work gone over with patient    Assessment/Plan:  1. Allergic dermatitis  Patient began a new diuretic from his cardiologist 1 week ago approximately 3 days ago he started with some bumps on his chest which have spread from his chest to his arms they are red with some little bumps on them this is a video visit patient was unable to send us a picture.  This sounds like a drug reaction to the new medication.  We will discontinue the medication and can expect this rash to resolve with the appropriate treatment which is will be a mixture of Caladryl lotion and 1% OTC hydrocortisone with the patient can self mix I went over the directions how to compounded at home he will apply this twice daily should the rash worsen or if he should develop difficulty swallowing or an increase in vital signs he is to call us back or present to emergency room.        Phone call duration:  13 minutes    William Alejandra MA

## 2021-06-07 NOTE — TELEPHONE ENCOUNTER
Please set up phone or video visit with me next week and I can discuss changing to Entresto with him.  If he has a home blood pressure cuff, please have him check BP daily.

## 2021-06-07 NOTE — TELEPHONE ENCOUNTER
New Appointment Needed  What is the reason for the visit:  Labs ordered by cardiologist.     Provider Preference: [facility]    How soon do you need to be seen?: Monday 04/13    Waitlist offered?: No    Okay to leave a detailed message:  Yes

## 2021-06-07 NOTE — TELEPHONE ENCOUNTER
Rash may be related to eplerenone.  Have him stop medication to see if rash goes away.  If he continues to have rash even after stopping, he should contact his primary.

## 2021-06-07 NOTE — PROGRESS NOTES
Review of Systems - History obtained from the patient  General ROS: positive for  - weight gain  Psychological ROS: negative  Ophthalmic ROS: negative  ENT ROS: negative  Hematological and Lymphatic ROS: negative  Respiratory ROS: positive for - shortness of breath  Cardiovascular ROS: positive for - shortness of breath  Gastrointestinal ROS: negative  Genito-Urinary ROS: negative  Musculoskeletal ROS: positive for - joint pain due to arthritis  Neurological ROS: positive for - daytime sleepiness  Dermatological ROS: negative

## 2021-06-07 NOTE — TELEPHONE ENCOUNTER
"Contacted Glen today to discuss his response to the Lasix dose increased to 80mg daily Thurs, Fri, and Sat. He reports that this did not go as he had hoped. In fact, it caused him more swelling in his ankles and feet than ever. His weight did not change, is still at 196#.  He golfed on Sunday (rode in cart) and was commenting that he was glad that he only took the one tablet, as the bathrooms were shut down with the new rules, and there \" are not enough trees out there for me to water\".  His ankle and feet swelling seemed some improved Sunday with the lower dose, oddly enough.   Quizzed him to be sure that he doubled the correct medication, which indeed he had.    Also reports the chest rash he has from the Spironolactone, is 40% resolved.    FYI to Cassi Shin CNP, any new recommendations at this time.? sk/RN  "

## 2021-06-07 NOTE — PATIENT INSTRUCTIONS - HE
Glenn Caballero,    It was a pleasure to see you today at the [facility].     My recommendations after this visit include:  - Stop irbesartan.  Start Entresto 24/26 mg tablet twice daily.    - Please call the clinic if you have any increased lightheadedness or dizziness with this medication change   - Continue to check blood pressure daily.  Please call if top number is consistently less than 100.   - Schedule lab (basic metabolic profile) appointment at Dr. Blanco's office about 1 week after starting Entresto.   - Follow up with phone or video visit with me in 4 weeks.   - Please call my nurse Eva if you have any concerns: [phone]    Cassi Shin, CNP

## 2021-06-07 NOTE — TELEPHONE ENCOUNTER
Glen was contacted today to obtain status update regarding his rash that had appeared following the initiation of Spironolactone. He stopped taking this on Wednesday this week. He is now reporting no improvement in the rash, but rather it is spreading.  It is now up his chest onto his neck and shoulder area. It is now getting little papules on the heads of the red rashy spots.   Glen was directed to seek an evaluation by his PCP today if possible. He was in agreement with this plan. He will let us know the outcome of this evaluation.    FYI to Cassi Shin, ONEIL sk/RN

## 2021-06-07 NOTE — TELEPHONE ENCOUNTER
Contacted Glen this morning to discuss his tolerance to the Spironolactone 25mg daily. Reviewed labs of yesterday, stable at this time.   He was not exhibiting any dizziness, nausea, headache, or diarrhea, however, he does have an area of itchy skin rash that developed on his chest area, near his L arm pit that is spreading,he applied cortisone cream on Saturday,so it is no longer itchy, but the rash itself is spreading over more area. His pharmacy filled his Rx with the generic Eplerenone preparation.    Discussed with him Entresto Patient Assistance program, and the 30 day free coupon available. Advised that Scheduling will be reaching out to him to arrange a phone appt w/ Cassi Shin CNP to review this medication in more detail.     Cassi, any recommendations regarding the rash/ side effect of Spironolactone? sk/RN

## 2021-06-07 NOTE — TELEPHONE ENCOUNTER
----- Message from Cassi Shin CNP sent at 4/22/2020  8:42 AM CDT -----  Please call Glen on Monday.  I increased lasix for 3 days starting tomorrow.

## 2021-06-07 NOTE — TELEPHONE ENCOUNTER
----- Message from Cassi Shin CNP sent at 4/14/2020  7:33 AM CDT -----  Glen had BMP yesterday and overall stable since starting spironolactone.  Please call him today and make sure he is tolerating spironolactone.  Set up phone visit with me this week or next so I can start Entresto.    Thanks

## 2021-06-07 NOTE — TELEPHONE ENCOUNTER
Telephone visit, in clinic visit, or [facility] visit?   Looks like he was seen in February due to shortness of breath.

## 2021-06-07 NOTE — TELEPHONE ENCOUNTER
Glen returned call to advise us of the outcome of his evaluation with PCP today. He was advised that is is most likely a medication reaction and could take up to a week to resolve. Was advised to use Calamine lotion and corn starch baths. He will f/u with Cassi Shin next week w/ phone visit. sk/RN

## 2021-06-07 NOTE — TELEPHONE ENCOUNTER
Glen was contacted to advise that Cassi Shin CNP has not further recommendations at this time. Confirmed his f/u with Dr. Barboza, in two weeks for phone visit. Advised him to contact the clinic if he has any issues with continued fluid wt increases in the meantime.sk/RN

## 2021-06-07 NOTE — TELEPHONE ENCOUNTER
Glen is calling in today his rash is nearly gone from the Spironolactone medication reaction.   He is wondering if he could see about starting the Entresto medication. His energy level is quite low, he is dragging. He feels like he needs to do something different. He has returned the Pt Assistance Forms in the mail to our office, I will check to see if these have been received and fax them off to [facility] next week FERNANDEZPRadha Ye, would you consider starting him using the 30 day free coupon I have some here., sk/RN

## 2021-06-07 NOTE — TELEPHONE ENCOUNTER
Glen is contacted with the recommendation to stop the Spironolactone for now to see if his rash resolves with the holding of the medication. He took his morning dose of this already today, so will begin tomorrow. I will check in with him on Friday to see if improving. sk/RN

## 2021-06-07 NOTE — TELEPHONE ENCOUNTER
Glen was contacted and advised Cassi would request a phone or video visit with him next week to discuss the Entresto. Message to scheduling to arrange. He has a b/p monitor at home, and will do daily tracking. sk/RN

## 2021-06-07 NOTE — PROGRESS NOTES
"Glenn Caballero is a 79 y.o. male who is being evaluated via a billable telephone visit.      The patient has been notified of following:     \"This telephone visit will be conducted via a call between you and your physician/provider. We have found that certain health care needs can be provided without the need for a physical exam.  This service lets us provide the care you need with a short phone conversation.  If a prescription is necessary we can send it directly to your pharmacy.  If lab work is needed we can place an order for that and you can then stop by our lab to have the test done at a later time.    If during the course of the call the physician/provider feels a telephone visit is not appropriate, you will not be charged for this service.\"         Glenn Caballero complains of    Chief Complaint   Patient presents with     Chest Pain     recently has had cold symptoms - SOB worse with activity - has travel out of the state in        I have reviewed and updated the patient's Past Medical History, Social History, Family History and Medication List.    ALLERGIES  Amiodarone; Amoxicillin; and Multaq [dronedarone]      No children  Tobacco: quit after teenager  EtOH: occ beer now and then  Mom -  85 diabetes  Dad -  MVA  2 bros -   3 sis - one  due to colon CA; youngest sis with end-stage leukemia  Surgeries: pacemaker; cardiac ablation  Hospitalizations: a. 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 Blanco I need your help,  I was taking Zolpidem 10 mg to help me sleep. It worked just fine. You switched me to Temazepam, 15mg, it doesn't work for me, I wake up every two hours and can't get back to sleep. I took it on 4 different nights and slept very little. I would like to " "go back to Zolpidem. I have 2 left.    Additional provider notes: Virtual visit completed today.  Concerns for shortness of breath primarily with activity.  Sensation of chest pressure over past 4 days reviewed.  Discussed initial illness following travel to [place] February 10 through February 14, 2020 with described \"terrible chest cold\" lasting 14 days then better.  Subsequently seen with chest x-ray appearing negative on 2/18/20.  Has had some recurrent cold symptoms.  Denies exposure to Covid-19.  No fevers.  Had been using hydroxyzine 50 mg 3 times daily on a regular basis over past 3 weeks or so since being prescribed at office visit February 18, 2020.  Was not aware that he could use it on as-needed basis only.  He thought that he was to use this medication for 1 month while allowing citalopram 10 mg daily to kick in for anxiety management.  Does feel that anxiety is improved on citalopram 10 mg daily.  Discontinued hydroxyzine yesterday and now states that chest pressure sensation perhaps 10% of what it was yesterday and if it was only this bad would not of called.  Does have cardiomyopathy with worsening ejection fraction from 40% down to 25% on most recent echocardiogram March 11, 2020.  Has scheduled follow-up with Dr. Barboza currently May 11, 2020 for reassessment.  Continues Avapro 150 mg daily for hypertension management as well as benefits with cardiomyopathy.  Coreg 25 mg twice daily continues.  Remains on Xarelto 20 mg daily.  Furosemide 40 mg each morning.  Had been given spironolactone in the past however caused knuckle swelling and back pain therefore he had discontinued spironolactone and has not had any recurrent concerns for peripheral edema while on current dosing of furosemide.  Remains on levothyroxine 125 mcg daily.  History of CKD stage III with prior creatinine of 1.36 and GFR 51.        Assessment/Plan:    1. Dyspnea on exertion  Describe dyspnea on exertion now at baseline " "associated with known cardiomyopathy with EF 25%.  Patient feels that the symptoms are stable currently and will continue to monitor.  Notify of concerns for orthopnea or PND or recurrent peripheral edema.    2. Chest pressure  Chest pressure also improved dramatically since discontinuation of hydroxyzine.  Patient does not feel that this is necessarily cardiac related and elects to monitor without further assessment through emergency department etc. at this time.  Will notify immediately or be seen if worsening chest pressure etc. described.    3. Nonischemic cardiomyopathy (H)  Worsening ejection fraction decreasing from 40% down to 25% on most recent echocardiogram March 11, 2020.  Has follow-up with Dr. Barboza May 11, 2020 noted.    4. Hypothyroidism, unspecified type  Continues thyroid replacement of levothyroxine 125 mcg daily.    5. CKD (chronic kidney disease) stage 3, GFR 30-59 ml/min (H)  CKD stage IIIa with prior creatinine 1.36 and GFR 51.    6. Anxiety  Continue citalopram 10 mg daily.  Hydroxyzine 50 mg 3 times daily as needed will remain available however does not feel required at this time.    7. Insomnia  Zolpidem 10 mg at bedtime as needed insomnia.  Has tried lower dose however not effective.  No side effects of current dosing identified.  Refills provided.    8. URI  Discussed initial illness following travel to [place] February 10 through February 14, 2020 with described \"terrible chest cold\" lasting 14 days then better.  Subsequently seen with chest x-ray appearing negative.  Recurrent URI symptoms.  No clear exposure to Covid-19.  No significant cough.  No fever.  Social distancing reviewed.  Immediate reassessment if worsening symptoms noted.      Echo 3/11/20 Summary     The estimated left ventricular ejection fraction is 25%. This represents a severely decreased ejection fraction. Cavity is mildly increased.    Normal right ventricular size. The systolic function is mildly " reduced.    Severe biatrial enlargement    Mild tricuspid valve regurgitation. Mild pulmonary hypertension present.    Mild to moderate mitral regurgitation.    When compared to the previous study dated 11/8/2018, left ventricular dilatation with decrease in systolic function is demonstrated.       Echo 11/8/20 Summary     Severe left atrial enlargement    Left ventricle ejection fraction is moderately decreased. The calculated left ventricular ejection fraction is 40%. There is global hypokinesis with akinesis of the mid to basal inferior wall    Normal right ventricular size with decreased systolic function.    Mild mitral and tricuspid regurgitation. No evidence of pulmonary hypertension    When compared to the previous study dated 9/15/2016, the mid to basal inferior wall appears akinetic on the current study.       EXAM: XR CHEST 2 VIEWS  LOCATION: [facility]  DATE/TIME: 2/18/2020 9:50 AM     INDICATION: Shortness of breath  COMPARISON: 06/26/2019 and older studies.     IMPRESSION:   Multi lead left subclavian pacer. Cardiomegaly is unchanged. No signs of failure or effusions. Lungs are clear.      Phone call duration:  15 minutes    Collin Blanco MD

## 2021-06-07 NOTE — PROGRESS NOTES
Review of systems are positive for leg/feet swelling, rash. All other review of systems are within normal limits.

## 2021-06-07 NOTE — PROGRESS NOTES
Mailed forms to patient today, with self addressed stamped envelope to return completed forms to clinic for processing. sk/RN

## 2021-06-07 NOTE — TELEPHONE ENCOUNTER
Shortness of breath x 4 days.  No cough.    No other symptoms ( no fever, chest pain, cough )    Has long standing breathing issues.    Asking to see Varsha Salmon.    Took a mucinex and not improved.    Provider please advise and have team update patient    Estrellita Dill, RN [facility] Triage Nurse Advisor        Reason for Disposition    Patient wants to be seen    Protocols used: BREATHING DIFFICULTY-A-OH

## 2021-06-07 NOTE — TELEPHONE ENCOUNTER
Helped arrange a telephone visit for tomorrow at 9 am with Dr. Blanco. Patient is to call back with any worsening symptoms.

## 2021-06-08 NOTE — TELEPHONE ENCOUNTER
----- Message from Cassi Shin CNP sent at 5/15/2020  3:21 PM CDT -----  Please let Glen know labs are overall stable. Potassium slightly up so do not eat too many high potassium foods. May also be related to starting Entresto.  Please recheck BMP 1-2 days before my appointment with him in June.   How much Lantus is he currently taking, and how much Humalog is he taking?  I think doses might have been changed when he was in rehab facility.

## 2021-06-08 NOTE — TELEPHONE ENCOUNTER
Glen is calling today to ask about his Entresto dose.  He is due to see you for his visit on June 8th, he is going to need to refill the Entresto on 6/6 but does not want to get the 30 day supply of this dose if you are only going to up the dose.  He is doing great! B/P readings are 110-120 over 70's HR 76-84 he has been tolerating activity more since starting the medication. Golfing is going better, less winded.     Would you prefer he asks the pharmacy for 2 days worth of the lower dose, and then let you decide if he moves higher ? Or go ahead and order the higher dose now?    Cassi Shin CNP  What do you recommend? Sk/RN

## 2021-06-08 NOTE — PATIENT INSTRUCTIONS - HE
Glenn Caballero,      My recommendations after this visit include:  - Increase Entresto to 49/51 mg twice daily.    - Decrease lasix to 20 mg daily.   - Please call with any increased dizziness, swelling, weight gain, or shortness of breath.  - Continue to check your blood pressure and weight daily.   - Please call my nurse Eva if you have any concerns: [phone]  - Schedule labs with Dr. Blanco's office in about 3 weeks and follow up with me 1 day after lab appointment.     Cassi Shin, CNP

## 2021-06-08 NOTE — TELEPHONE ENCOUNTER
Glen is calling in today to advise of new onset of swelling in both ankles following the reduction of the Lasix to 20mg daily, right around the time he increased the Entresto to the next level in dose.  He noted his wt is up about 2-3 # from his normal.  Wondering if he needed to go back to taking the Lasix 40mg daily?    Cassi Shin, ONEIL what are your thoughts on this,? sk/RN

## 2021-06-08 NOTE — PATIENT INSTRUCTIONS - HE
Glenn Caballero    Thank you for coming to the [facility] today for a cardiac evaluation  It was my pleasure to see you today  A good contact for any questions would be Kajal Marie  RN @ [phone]       You had allergic reaction to inspra- whichhas been discontinued  Previously you had problems with Spironolactone  Now are on Entresto and seem to have improved; this medication will be uptitrated as tolerated   Echocardiogram March 11, 2020 showed a declining ejection fraction of 25%  Device interrogation shows the battery should last another 1 year 11 months  No abnormal ventricular arrhythmias were identified  There was evidence for fluid retention on the Optvol l detection algorithm  You tell me your weight is up several pounds but is declining now and currently take furosemide 40 mg daily  Blood work April 13, 2020 was good, creatinine 1.37 is nearly normal  Your blood pressure has been satisfactory watch to follow this closely as he started on Entresto  Continue to have device check every 3 months through transtelephonic monitoring  Return in 6 months for comprehensive cardiac arrhythmia device assessment

## 2021-06-08 NOTE — PROGRESS NOTES
Patient Assistance forms faxed today. Will await their reply regarding coverage of his Entresto for the remainder of this calendar year. sk/RN

## 2021-06-08 NOTE — PROGRESS NOTES
Subjective:    Glenn Caballero is seen today for bilateral eye irritation.  Has had recurring concerns with this.  Was given tobramycin ophthalmic drops May 22, 2015 for similar concern with benefit.  Would notice occasional recurrence and use drops for a day or 2 and then symptoms would resolve therefore discontinued.  Now ran out of tobramycin ophthalmic drops and would like refill for this.  Denies significant vision change.  No eye pain.  Uncertain of allergies other than sneezes after eating chocolates.  Has had issues with chronic insomnia and had been switched to trazodone recently due to medication formulary request.  Reviewed labs from 2016 with history of paroxysmal atrial fibrillation.  History of nonischemic cardiomyopathy.  TSH and CBC normal.  Nonfasting blood sugar 128.      No children  Tobacco: quit after teenager  EtOH: occ beer now and then  Mom -  85 diabetes  Dad -  MVA  2 bros -   3 sis - one  due to colon CA; youngest sis with end-stage leukemia  Surgeries: pacemaker; cardiac ablation  Hospitalizations: a. 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  Hobbies: golf    Work out 4-5 times per week at [employer]; bike; h/o running    Past Surgical History   Procedure Laterality Date     Pr cardioversion elective arrhythmia external  6/16/15; 7/13/15     Description: Elective Cardioversion External;  Recorded: 2012;  Comments: 13 for afib     Pr ablate heart dysrhythm focus       Description: Catheter Ablation Atrial Flutter;  Recorded: 2012;  Comments:  for typical right isthmus dependent flutter     Ablation of dysrhythmic focus       Insert / replace / remove pacemaker       Pvi ablation  16     Cardiac defibrillator placement Left 2016     Medtronic     Cardioversion  2016        Family History   Problem  Relation Age of Onset     Diabetes Mother      Colon cancer Sister      Cancer Sister      BLOOD CANCER        Past Medical History   Diagnosis Date     Arrhythmia      Afib     Atrial fibrillation      Atrial flutter      Cardiomyopathy      CHF (congestive heart failure)      Disease of thyroid gland      Hypothyroid     Hyperlipidemia      Hypertension      Pacemaker      Peptic ulcer disease      Upper GI bleed      due to Duodenal ulcer from NSAID and Warfarin        Social History   Substance Use Topics     Smoking status: Never Smoker     Smokeless tobacco: Never Used     Alcohol use 1.8 oz/week     3 Cans of beer per week        Current Outpatient Prescriptions   Medication Sig Dispense Refill     acetaminophen (TYLENOL) 500 MG tablet Take 1,000 mg by mouth every 6 (six) hours as needed for pain.       ascorbic acid (VITAMIN C) 500 MG tablet Take 500 mg by mouth 2 (two) times a day.       carvedilol (COREG) 12.5 MG tablet Take 1 tablet (12.5 mg total) by mouth 2 (two) times a day with meals. Start reduced dose on 8/16, when starting your Sotalol. 60 tablet 12     furosemide (LASIX) 20 MG tablet Take 2 tablets (40 mg total) by mouth daily. 180 tablet 2     GRAPE SEED EXTRACT ORAL Take 2 capsules by mouth daily.       irbesartan (AVAPRO) 150 MG tablet TAKE 1 TABLET (150 MG) BY ORAL ROUTE ONCE DAILY 90 tablet 3     levothyroxine (SYNTHROID, LEVOTHROID) 125 MCG tablet TAKE 1 TABLET (125 MCG TOTAL) BY MOUTH EVERY MORNING. 90 tablet 3     multivitamin Liqd solution Take 5 mL by mouth every morning.        omeprazole (PRILOSEC) 20 MG capsule TAKE 1 CAPSULE (20 MG TOTAL) BY MOUTH DAILY. 90 capsule 1     rivaroxaban (XARELTO) 20 mg Tab Take 1 tablet (20 mg total) by mouth daily with supper. 30 tablet 12     sotalol (BETAPACE) 80 MG tablet Take 1 tablet (80 mg total) by mouth 2 (two) times a day. Start on Tue 8/16, & reduce Coreg to 12.5mg BID on Tue 8/16 when starting. 60 tablet 12     traZODone (DESYREL) 100 MG  tablet Take 0.5-1 tablets ( mg total) by mouth bedtime. 30 tablet 2     zolpidem (AMBIEN) 10 mg tablet TAKE ONE TABLET BY MOUTH AT BEDTIME AS NEEDED FOR SLEEP 30 tablet 2     tobramycin (TOBREX) 0.3 % ophthalmic solution Administer 1 drop to both eyes every 4 (four) hours for 7 days. 5 mL 1     No current facility-administered medications for this visit.           Objective:    Vitals:    01/05/17 1612   BP: 112/70   Pulse: 80   Weight: 190 lb (86.2 kg)      Body mass index is 24.73 kg/(m^2).    Alert.  No apparent distress.  Bilateral conjunctival injection right greater than left.  Pupils equal round react to light.  Nasopharynx and oropharynx normal.  Neck supple.  Chest clear.  Cardiac exam regular.      Assessment:    1. Bilateral conjunctivitis  tobramycin (TOBREX) 0.3 % ophthalmic solution   2. Insomnia     3. Paroxysmal atrial fibrillation           Plan:    Refill on tobramycin 0.3% ophthalmic drops to use 1 drop both eyes every 4 hours ×7 days.  Did recommend due to recurrent concerns following up with ophthalmologist for further testing.  We'll initiate trazodone this evening 100 mg use half tablet one tablet as needed.  Notify of persistent concerns or worsening insomnia.  Will follow up with cardiologist as scheduled for history of paroxysmal atrial fibrillation management etc. with history of nonischemic cardiomyopathy noted.

## 2021-06-08 NOTE — PROGRESS NOTES
Remote device check.  Please see link for full device report.  Patient was informed of results and next follow up via mail.   Qiana Main RN

## 2021-06-08 NOTE — TELEPHONE ENCOUNTER
Left detailed message with results, recommendations and the clinic direct number for return call to schedule lab appointment on Friday, May 29 prior to already scheduled follow up d 6/3.

## 2021-06-08 NOTE — TELEPHONE ENCOUNTER
Glen was contacted today to advise him of recommendations to increase his lasix back to 40mg daily. Should his edema not improve or wt does not go down he was advised to return call to the clinic sk/RN

## 2021-06-08 NOTE — PROGRESS NOTES
Review of Systems - History obtained from the patient  General ROS: positive for  - weight gain and weight loss  Psychological ROS: positive for - anxiety  Ophthalmic ROS: negative  ENT ROS: negative  Allergy and Immunology ROS: negative  Hematological and Lymphatic ROS: negative  Endocrine ROS: negative  Respiratory ROS: positive for - shortness of breath  Cardiovascular ROS: positive for - dyspnea on exertion, leg swelling, and a fib   Gastrointestinal ROS: negative  Genito-Urinary ROS: negative  Musculoskeletal ROS: positive for - joint pain, osteoarthritis, shoulder  Neurological ROS: negative  Dermatological ROS: positive for rash

## 2021-06-08 NOTE — TELEPHONE ENCOUNTER
Increase lasix back to 40 mg daily. He should call later this week if edema and weights do not improve.

## 2021-06-08 NOTE — TELEPHONE ENCOUNTER
Pharmacy requesting refill of citalopram hydrobromide 10 mg 1 tab daily     Please fill if appropriate

## 2021-06-08 NOTE — TELEPHONE ENCOUNTER
----- Message from Cassi Shin CNP sent at 5/15/2020  3:21 PM CDT -----  Please let Glen know labs are overall stable. Potassium slightly up so do not eat too many high potassium foods. May also be related to starting Entresto.  Please recheck BMP 1-2 days before my appointment with him in June.

## 2021-06-08 NOTE — TELEPHONE ENCOUNTER
I want to check another BMP before increasing Entresto since his K was slightly elevated at 5.2 after starting Entresto.  Can he have labs this week and then move up his appointment with me to this week (I am off next week)?    Cassi

## 2021-06-08 NOTE — TELEPHONE ENCOUNTER
Please call patient to arrange repeat bmp 5/29 just prior to already scheduled f/u.  Thank you - Anjana  ----------------------------------  Order placed for bmp.

## 2021-06-09 ENCOUNTER — RECORDS - HEALTHEAST (OUTPATIENT)
Dept: ADMINISTRATIVE | Facility: CLINIC | Age: 81
End: 2021-06-09

## 2021-06-09 NOTE — TELEPHONE ENCOUNTER
Glenn was calling today to let us know that he has doubled up on his existing supply of the Entresto to achieve the 97-103mg dosage. He will run out of tablets on Wednesday this week.   His new Rx was faxed to [employer] earlier this morning. A follow-up call to them was placed to confirm receipt and to place the order of his medication on a rush mail /overnite shipment.  He was notified of this information.   He would like a Rx for additional tablets to cover him until this shipment arrives.   Additionally, we discussed his impending move, that will change his address. He will make this address change with them once it is finalized for the dates of his departure.     Cassi Shin, CNP will you approve a Rx for an additional 10# tablets for Glenn, at this time? I have tried to seek samples from representatives of the [employer] group in our area without response, perhaps if we hear back from them he will not need to fill the Rx.   Thank you,   Eva

## 2021-06-09 NOTE — PATIENT INSTRUCTIONS - HE
Glenn Caballero,      My recommendations after this visit include:  - We will contact you after labs are done.    - Please start looking at primary care providers and cardiologists in [place] and schedule appointments in the first 1-2 months after you move.   - Please call my nurse Eva if you have any concerns: [phone]    Cassi Shin, CNP

## 2021-06-09 NOTE — TELEPHONE ENCOUNTER
Glen was contacted with lab results today. He was advised to increase the Entresto dose to 97/103mg two times a day  This will be faxed to [employer] to update them on his new dose, prior to them mailing out his drug supply for the 90Days. Glen will plan to increase his dose with the current supply he has by doubling the tablets for now.  His K+ is elevated, discussed high potassium foods and to moderate or limit those.  BMP to be drawn in 2-3 weeks. F/u with [facility] 1-2 days after labs.     Glen is moving to [place] and will close on his house  7/13/20 therefore will need f/u appt prior to this date. Sk./RN

## 2021-06-09 NOTE — PROGRESS NOTES
New Rx faxed to [employer] today for the Entresto 97-103mg tablet for the two times a day dosing for a 90 day supply.   He was advised of this information sk/RN

## 2021-06-09 NOTE — TELEPHONE ENCOUNTER
----- Message from Cassi Shin CNP sent at 6/25/2020  8:53 AM CDT -----  Labs are overall stable.  Increase Entresto to 97/103 mg twice daily. He has patient assistance so I am not sure if you need to do anything for this.  Please set up BMP in 2-3 weeks and follow up virtual appointment 1-2 days after labs.  He is moving to [place] in July so labs and follow up need to be done while he is still in [place].

## 2021-06-09 NOTE — TELEPHONE ENCOUNTER
----- Message from Cassi Shin CNP sent at 7/7/2020 10:22 AM CDT -----  Glen still has not scheduled labs or follow up with me next week.  I know he is not moving right now but he still needs labs and follow up since I increased Entresto.  Please call him to explain this necessary follow up.     Thanks!

## 2021-06-09 NOTE — TELEPHONE ENCOUNTER
Yes, he can have a 10 tablet prescription so he does not run out of Entresto.      He still needs to schedule BMP and virtual appointment with me before he moves to [place].

## 2021-06-09 NOTE — TELEPHONE ENCOUNTER
Glen was contacted today to remind him of the need for labs /followup visit with Cassi Shin CNP next week. He was advised that this is due to his recent increase in his Entresto. He reports feeling great. He will call to arrange this later today, for next week, [place]. sk/RN

## 2021-06-09 NOTE — TELEPHONE ENCOUNTER
[facility] pharmacy was reached and provided them with the Free 30 day trial codes for Entresto and they will have his 97-103mg supply available for him tomorrow. This will bridge the gap for him until the Patient Assistance Order is sent out over nite express from [employer], which he should receive by Thursday or Friday.     RIZWANI to Cassi Shin CNP   Glen had not been able to use the free 30 day trial card in the past, so we did the entire 30 day Rx instead of the 10 day one. This will make a huge difference to him.     Also, since his house sale did not go through, the tino backed out, he relisted it, and now has additional offers and it may be later for the sale/move therefore he has not scheduled his appt/lab yet.     Thanks,  Eva

## 2021-06-10 NOTE — PROGRESS NOTES
[facility] Heart Care Note    Assessment:  Dilated congestive cardiomyopathy ejection fractions have been as low as 20%; more recently ejection fraction 35-37% by echocardiogram or nuclear scan         EF 39% Echocardiogram 5-2015        34 % by Echo        30-35% by echocardiogram September 15 2016  Paroxysmal atrial fibrillation; prior CV=3       Cardioversion August 18 2016       Cardioversion 8-       Prior atrial flutter ablation         AF ablation 1--lots of atrial scar  Sinus node dysfunction with AV block    Dual-chamber Medtronic pacemaker 7-  upgrade to CRTD 2- -Medtronic  No known coronary artery disease although nuclear scan did show a small fixed inferior lateral defect without reversibility-8/25/2014 ;   Normal coronary angiography 2005    Modest renal insufficiency with recent creatinine 1.4    Frequent PVCs about 42 per hour    Amiodarone toxicity with pulmonary insufficiency-severe    Chronic anticoagulation with Xarelto   Prior GI bleed felt related to nonsteroidal anti-inflammatories    Plan:  You had spontaneous conversion of atrial fibrillation and remained out of atrial fibrillation over the past several weeks  Continue current medications atrial fibrillation alert has been programmed for 24 hours.  Your home monitor will alert our clinic if you go into atrial fibrillation for more than 24 hours  I doubt that she will notice that you have atrial fibrillation since her heart rate will not change  Continue to have device checked through transtelephonic monitoring every 3 months  Return in one year for comprehensive cardiac arrhythmia and device assessment      Subjective:    I had the opportunity to see.Glenn Caballero , who is a 76 y.o. male with a known history of paroxysmal atrial fibrillation, cardiomyopathy  He had a successful cardioversion through his ICD August 18, 2016.  It was not clear that he had much improvement in symptoms following  cardioversion.    He remained out of atrial fibrillation for about 4 months.  Then he went back into atrial fibrillation and remained in atrial fibrillation for about 4 months with spontaneous conversion  During atrial fibrillation his ventricular rate was the same as his ventricular rate without atrial fibrillation-considering his biventricular pacing  Glenn had no symptoms during atrial fibrillation and could not tell any decline in exercise capacity endurance etc.  He has been quite active, works out on a regular basis seems to have good energy does what he wants without restrictions, has been golfing a few times  He has had no angina, feels no palpitations, is unaware of tachydysrhythmias  He denies fluid overload has no orthopnea PND or pedal edema  Medications have remained the same      Problem List:  Patient Active Problem List   Diagnosis     Localized Primary Osteoarthritis Of The Right Shoulder     Persistent Atrial Fibrillation     Nonischemic cardiomyopathy     Hypothyroidism     Dyslipidemia     Lump In / On The Skin     Ganglion     Chest pain     Insomnia     Esophageal reflux     Adverse Effect Of Amiodarone     Normocytic normochromic anemia     Presence of cardiac resynchronization therapy defibrillator-Medtronic 3-lead     Medical History:  Past Medical History:   Diagnosis Date     Arrhythmia     Afib     Atrial fibrillation      Atrial flutter      Cardiomyopathy      CHF (congestive heart failure)      Disease of thyroid gland     Hypothyroid     Hyperlipidemia      Hypertension      Pacemaker      Peptic ulcer disease      Upper GI bleed     due to Duodenal ulcer from NSAID and Warfarin     Surgical History:  Past Surgical History:   Procedure Laterality Date     ABLATION OF DYSRHYTHMIC FOCUS       CARDIAC DEFIBRILLATOR PLACEMENT Left 01/2016    Medtronic     CARDIOVERSION  08/18/2016     INSERT / REPLACE / REMOVE PACEMAKER  7/14     ND ABLATE HEART DYSRHYTHM FOCUS      Description: Catheter  Ablation Atrial Flutter;  Recorded: 07/11/2012;  Comments: 2005 for typical right isthmus dependent flutter     MS CARDIOVERSION ELECTIVE ARRHYTHMIA EXTERNAL  6/16/15; 7/13/15    Description: Elective Cardioversion External;  Recorded: 07/11/2012;  Comments: 5/18/12 for afib     pvi ablation  1/25/16     Social History:  Social History     Social History     Marital status: Single     Spouse name: N/A     Number of children: N/A     Years of education: N/A     Occupational History     Not on file.     Social History Main Topics     Smoking status: Never Smoker     Smokeless tobacco: Never Used     Alcohol use 1.8 oz/week     3 Cans of beer per week     Drug use: No     Sexual activity: Not on file      Comment: SINGLE      Other Topics Concern     Not on file     Social History Narrative       Review of Systems:      General: WNL  Eyes: WNL  Ears/Nose/Throat: WNL  Lungs: WNL  Heart: WNL  Stomach: WNL  Bladder: WNL  Muscle/Joints: WNL  Skin: WNL  Nervous System: WNL  Mental Health: WNL     Blood: WNL        Family History:  Family History   Problem Relation Age of Onset     Diabetes Mother      Colon cancer Sister      Cancer Sister      BLOOD CANCER         Allergies:  Allergies   Allergen Reactions     Amiodarone Other (See Comments)     Pulmonary toxicity     Amoxicillin Anaphylaxis     Multaq [Dronedarone] Other (See Comments)     Pt had similar response to Multaq as Amiodarone, c/o SOB and difficulty breathing       Medications:  Current Outpatient Prescriptions   Medication Sig Dispense Refill     acetaminophen (TYLENOL) 500 MG tablet Take 1,000 mg by mouth every 6 (six) hours as needed for pain.       ascorbic acid (VITAMIN C) 500 MG tablet Take 500 mg by mouth 2 (two) times a day.       carvedilol (COREG) 12.5 MG tablet Take 1 tablet (12.5 mg total) by mouth 2 (two) times a day with meals. 180 tablet 3     furosemide (LASIX) 20 MG tablet Take 2 tablets (40 mg total) by mouth daily. 180 tablet 2     irbesartan  "(AVAPRO) 150 MG tablet TAKE 1 TABLET (150 MG) BY ORAL ROUTE ONCE DAILY 90 tablet 3     levothyroxine (SYNTHROID, LEVOTHROID) 125 MCG tablet TAKE 1 TABLET (125 MCG TOTAL) BY MOUTH EVERY MORNING. 90 tablet 3     multivitamin Liqd solution Take 5 mL by mouth every morning.        rivaroxaban (XARELTO) 20 mg Tab Take 1 tablet (20 mg total) by mouth daily with supper. 90 tablet 3     sotalol (BETAPACE) 80 MG tablet Take 1 tablet (80 mg total) by mouth 2 (two) times a day. 180 tablet 3     zolpidem (AMBIEN) 10 mg tablet TAKE ONE TABLET BY MOUTH AT BEDTIME AS NEEDED for sleep 30 tablet 2     GRAPE SEED EXTRACT ORAL Take 2 capsules by mouth daily.       omeprazole (PRILOSEC) 20 MG capsule TAKE 1 CAPSULE (20 MG TOTAL) BY MOUTH DAILY. 90 capsule 1     No current facility-administered medications for this visit.          Surgical site  ICD is well-healed left subclavicular site    Device interrogation  Stenting rhythm is atrial paced biventricular paced  His intrinsic rhythm is sinus rhythm with first-degree AV block  No ventricular tachycardia  He has been in atrial fib/flutter about 58% of the time and persistent atrial dysrhythmia from December to mid  Lead function is satisfactory  Battery voltage good for another 6 years    Objective:   Vital signs:  /70 (Patient Site: Left Arm, Patient Position: Sitting, Cuff Size: Adult Regular)  Pulse 84  Ht 6' 1.5\" (1.867 m)  Wt 195 lb 4.8 oz (88.6 kg)  SpO2 97%  BMI 25.42 kg/m2  Pressure 120/70 sitting, 122 standing  Heart rate is 60 and regular without ectopics telemetry shows atrial paced ventricular paced pattern    Physical Exam:    GENERAL APPEARANCE: Alert, cooperative and in no acute distress.  HEENT: No scleral icterus. No Xanthelasma. Oral mucous membranes pink and moist.  NECK: JVP  No Hepatojugular reflux. Thyroid not  Palpable  CHEST: clear to auscultation and percussion  CARDIOVASCULAR: S1, S2 without murmur    Brachial, radial  pulses are intact and " symmetric.   No carotid bruits noted.  ABDOMEN: Non tender. BS+. No bruits.  EXTREMITIES: No cyanosis, clubbing or edema.    Lab Results:  LIPIDS:  Lab Results   Component Value Date    CHOL 173 06/24/2014    CHOL 225 (H) 01/13/2014    CHOL 238 (H) 07/20/2011     Lab Results   Component Value Date    HDL 52 06/24/2014    HDL 60 01/13/2014    HDL 46 07/20/2011     Lab Results   Component Value Date    LDLCALC 106 06/24/2014    LDLCALC 143 (H) 01/13/2014    LDLCALC 146 (H) 07/20/2011     Lab Results   Component Value Date    TRIG 74 06/24/2014    TRIG 108 01/13/2014    TRIG 234 (H) 07/20/2011     No components found for: CHOLHDL    BMP:  Lab Results   Component Value Date    CREATININE 1.13 12/16/2016    BUN 24 12/16/2016     12/16/2016    K 4.6 12/16/2016     12/16/2016    CO2 27 12/16/2016         This note has been dictated using voice recognition software. Any grammatical or context distortions are unintentional and inherent to the software.  Orlin Barboza MD  [facility]  [phone]

## 2021-06-11 NOTE — TELEPHONE ENCOUNTER
Controlled Substance Refill Request  Medication Name:   Requested Prescriptions     Pending Prescriptions Disp Refills     zolpidem (AMBIEN) 10 mg tablet 30 tablet 5     Sig: Take 1 tablet (10 mg total) by mouth at bedtime as needed for sleep.     Date Last Fill: 3/20/2020  Requested Pharmacy: [facility]  Submit electronically to pharmacy  Controlled Substance Agreement on file:   Encounter-Level CSA Scan Date:    There are no encounter-level csa scan date.        Last office visit:  4/17/2020    Patient has moved to [place] and does not have a new provider as of yet.   Patient is requesting this medication be sent to [facility] in [place] as he only has 4 pills left.

## 2021-06-11 NOTE — PROGRESS NOTES
Assessment:    1. Paroxysmal atrial fibrillation  Basic Metabolic Panel   2. Nonischemic cardiomyopathy  Lipid Cascade   3. Hypothyroidism, unspecified type  Thyroid Stimulating Hormone (TSH)   4. Chronic kidney disease (CKD), stage 3 (moderate)  Basic Metabolic Panel   5. Normochromic normocytic anemia  HM2(CBC w/o Differential)   6. Dermatitis           Plan:    Patient appears to be in regular rhythm currently without significant cardiac ectopy.  Patient cardiovascularly stable and will continue to follow with Dr. Barboza as scheduled with history of atrial fibrillation/atrial flutter with nonischemic cardiomyopathy.  Check TSH today.  Fasting lipid cascade obtained.  Basic metabolic panel to ensure stable renal function as well as fasting glucose.  CBC with history of normochromic normocytic anemia and history of prior GI bleed.  Continue home medication as noted.  Hydrocortisone 1% cream twice daily ×10-14 days for area of dermatitis on glans of penis.  Annual wellness visit anticipated in 6 months.        Subjective:    Glenn GUERA Caballero is seen today for follow-up evaluation.  History of paroxysmal atrial fibrillation.  Nonischemic cardiomyopathy.  Followed by Dr. Barboza with recent dose adjustment with discontinuation of sotalol and initiation of metoprolol succinate 50 mg daily.  Less palpitations noted.  Continues Coreg 12.5 mg twice daily and Xarelto 20 mg daily.  Avapro 150 mg daily for hypertension management as well as cardiovascular benefits.  Levothyroxine 125 mcg daily for thyroid replacement.  Has rash on head of penis over past 4 weeks.  Prior circumcision noted.    Dilated congestive cardiomyopathy ejection fractions have been as low as 20%; more recently ejection fraction 35-37% by echocardiogram or nuclear scan         EF 39% Echocardiogram 5-2015        34 % by Echo        30-35% by echocardiogram September 15 2016  Paroxysmal atrial fibrillation; prior CV=3       Cardioversion August 18         Cardioversion 2016       Prior atrial flutter ablation         AF ablation 2016-lots of atrial scar  Sinus node dysfunction with AV block    Dual-chamber Medtronic pacemaker 2014  upgrade to CRTD 2016 -Medtronic  No known coronary artery disease although nuclear scan did show a small fixed inferior lateral defect without reversibility-2014 ;   Normal coronary angiography     Modest renal insufficiency with recent creatinine 1.4    Frequent PVCs about 42 per hour    Amiodarone toxicity with pulmonary insufficiency-severe    Chronic anticoagulation with Xarelto   Prior GI bleed felt related to nonsteroidal anti-inflammatories      No children  Tobacco: quit after teenager  EtOH: occ beer now and then  Mom -  85 diabetes  Dad -  MVA  2 bros -   3 sis - one  due to colon CA; youngest sis with end-stage leukemia  Surgeries: pacemaker; cardiac ablation  Hospitalizations: a. 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  Hobbies: golf    Past Surgical History:   Procedure Laterality Date     ABLATION OF DYSRHYTHMIC FOCUS       CARDIAC DEFIBRILLATOR PLACEMENT Left 2016    Medtronic     CARDIOVERSION  2016     INSERT / REPLACE / REMOVE PACEMAKER       NE ABLATE HEART DYSRHYTHM FOCUS      Description: Catheter Ablation Atrial Flutter;  Recorded: 2012;  Comments:  for typical right isthmus dependent flutter     NE CARDIOVERSION ELECTIVE ARRHYTHMIA EXTERNAL  6/16/15; 7/13/15    Description: Elective Cardioversion External;  Recorded: 2012;  Comments: 13 for afib     pvi ablation  16        Family History   Problem Relation Age of Onset     Diabetes Mother      Colon cancer Sister      Cancer Sister      BLOOD CANCER        Past Medical History:   Diagnosis Date     Arrhythmia     Afib     Atrial fibrillation      Atrial  flutter      Cardiomyopathy      CHF (congestive heart failure)      Disease of thyroid gland     Hypothyroid     Hyperlipidemia      Hypertension      Pacemaker      Peptic ulcer disease      Upper GI bleed     due to Duodenal ulcer from NSAID and Warfarin        Social History   Substance Use Topics     Smoking status: Never Smoker     Smokeless tobacco: Never Used     Alcohol use 1.8 oz/week     3 Cans of beer per week        Current Outpatient Prescriptions   Medication Sig Dispense Refill     acetaminophen (TYLENOL) 500 MG tablet Take 1,000 mg by mouth every 6 (six) hours as needed for pain.       ascorbic acid (VITAMIN C) 500 MG tablet Take 500 mg by mouth 2 (two) times a day.       carvedilol (COREG) 12.5 MG tablet Take 1 tablet (12.5 mg total) by mouth 2 (two) times a day with meals. 180 tablet 3     furosemide (LASIX) 20 MG tablet Take 2 tablets (40 mg total) by mouth daily. 180 tablet 2     irbesartan (AVAPRO) 150 MG tablet TAKE 1 TABLET (150 MG) BY ORAL ROUTE ONCE DAILY 90 tablet 3     levothyroxine (SYNTHROID, LEVOTHROID) 125 MCG tablet TAKE 1 TABLET (125 MCG TOTAL) BY MOUTH EVERY MORNING. 90 tablet 1     metoprolol succinate (TOPROL XL) 50 MG 24 hr tablet Take 1 tablet (50 mg total) by mouth daily. Stop Sotalol when starting. 180 tablet 3     multivitamin Liqd solution Take 5 mL by mouth every morning.        rivaroxaban (XARELTO) 20 mg Tab Take 1 tablet (20 mg total) by mouth daily with supper. 90 tablet 3     zolpidem (AMBIEN) 10 mg tablet TAKE ONE TABLET BY MOUTH AT BEDTIME AS NEEDED FOR SLEEP 30 tablet 2     GRAPE SEED EXTRACT ORAL Take 2 capsules by mouth daily.       omeprazole (PRILOSEC) 20 MG capsule TAKE 1 CAPSULE (20 MG TOTAL) BY MOUTH DAILY. 90 capsule 1     No current facility-administered medications for this visit.           Objective:    Vitals:    07/07/17 0820   BP: 110/78   Pulse: 72   Weight: 187 lb (84.8 kg)      Body mass index is 24.34 kg/(m^2).    Alert.  No apparent distress.   Chest clear.  Cardiac exam appears regular without cardiac ectopy.  Abdomen benign.  Extremities warm and dry.  Genitalia circumcised male.  Small area of erythema on dorsum of glands of penis.  No excoriation.  No drainage.  No urethral meatus discharge.      Echo 9/15/16:  Conclusions       Summary   Mildly dilated left ventricle.   Left ventricular ejection fraction is visually estimated to be 30-35%%.   Global hypokinesis of the left ventricle with severe impairment of   systolic function.   Pacer wire visualized in right ventricle.   Moderate biatrial enlargement.   Mild mitral regurgitation.   When compared to the previous echocardiogram dated 4/19/16 , left   ventricular systolic function has slightly improved.

## 2021-06-16 PROBLEM — R53.83 FATIGUE, UNSPECIFIED TYPE: Status: ACTIVE | Noted: 2019-06-26

## 2021-06-16 PROBLEM — G62.9 PERIPHERAL POLYNEUROPATHY: Status: ACTIVE | Noted: 2019-07-24

## 2021-06-16 PROBLEM — E55.9 VITAMIN D DEFICIENCY: Status: ACTIVE | Noted: 2019-07-24

## 2021-06-16 PROBLEM — R60.0 BILATERAL LEG EDEMA: Status: ACTIVE | Noted: 2019-06-26

## 2021-06-16 PROBLEM — R06.02 SOB (SHORTNESS OF BREATH): Status: ACTIVE | Noted: 2019-06-26

## 2021-06-16 PROBLEM — Z80.0 FAMILY HISTORY OF COLON CANCER: Status: ACTIVE | Noted: 2018-11-07

## 2021-06-16 PROBLEM — I50.20 HFREF (HEART FAILURE WITH REDUCED EJECTION FRACTION) (H): Status: ACTIVE | Noted: 2020-04-01

## 2021-06-16 NOTE — TELEPHONE ENCOUNTER
Telephone Encounter by Anastasia Reardon RN at 6/25/2019  8:28 AM     Author: Anastasia Reardon RN Service: -- Author Type: Registered Nurse    Filed: 6/25/2019  9:22 AM Encounter Date: 6/24/2019 Status: Signed    : Anastasia Reardon RN (Registered Nurse)       Steph Membreno EPS Johnson, Caroline, RN             Here he is!     Type: Add-on remote CRT-D transmission for fatigue per Anastasia ZAMBRANO   Presenting: Atrial fibrillation with BiV sensing and pacing, rate 70s.   Lead/Battery Status: Stable. Some variation in R wave measurements, noted in the past.   Atrial Arrhythmias: Since 5/13/19, presumed permanent atrial fibrillation (programmed VVIR), overall ventricular rates >/=120 bpm <5%.   Vent Arrhythmias: Since 5/13/19, three NSVT detected.   Anticoagulant: Xarelto.   Comments: Normal CRT-D function. BiV pacing 72.1% (consistent with the past). Routed to Anastasia ZAMBRANO   Alerts: None. KLP

## 2021-06-17 NOTE — PROGRESS NOTES
In clinic device check with Device RN and Dr. Barboza.  Please see link for full device report.  Patient was informed of results and next follow up during today's visit.

## 2021-06-17 NOTE — PROGRESS NOTES
[facility] Care Note  Assessment:  Dilated congestive cardiomyopathy ejection fractions have been as low as 20%; more recently ejection fraction 35-37% by echocardiogram or nuclear scan         EF 39% Echocardiogram 5-2015        34 % by Echo        30-35% by echocardiogram September 15 2016  Paroxysmal atrial fibrillation; prior CV=3       Cardioversion August 18 2016       Cardioversion 8-       Prior atrial flutter ablation         AF ablation 1--lots of atrial scar  Atrial fibrillation with rate control strategy May 2017  Sinus node dysfunction with AV block    Dual-chamber Medtronic pacemaker 7-  upgrade to CRTD 2- -Medtronic  No known coronary artery disease although nuclear scan did show a small fixed inferior lateral defect without reversibility-8/25/2014 ;   Normal coronary angiography 2005    Modest renal insufficiency with recent creatinine 1.4    Frequent PVCs about 42 per hour    Amiodarone toxicity with pulmonary insufficiency-severe    Chronic anticoagulation with Xarelto   Prior GI bleed felt related to nonsteroidal anti-inflammatories      Plan:  Pacemaker/defibrillator evaluation is excellent-good for another 3 years of battery   We made mild pacemaker adjustments, lower pacing rate now is 60 bpm will pace up to 130 PPM  Stop metoprolol  Increase carvedilol from 12.5 mg twice daily; now take 25 mg twice daily-prescription called to pharmacy  Carvedilol is probably a better medicine for cardiomyopathy  Continue  other medications  Return in 6 months; obtain echocardiogram prior to that visit      Subjective:    I had the opportunity to see.Glenn GUERA Harperraven , who is a 77 y.o. male with a known history of atrial fibrillation and nonischemic cardiomyopathy  Glenn has been doing very well  Because of persistent atrial fibrillation his sotalol was stopped and started metoprolol 50 mg.  He noticed a 10 pound weight gain when he started metoprolol, has been having  trouble losing these 10 pounds  He feels good  No palpitations  No chest pain  Fluid levels are well controlled while taken furosemide on a daily basis  No orthopnea or PND  His exercise capacity, endurance is satisfactory  Continues to take Xarelto anticoagulation without difficulty no recurrence of GI bleed  He, like many of us, is waiting for winter  To end  so he can get on the [place] course    Non-paced EKG showed atrial fibrillation with right bundle branch block left anterior axis  Ventricular paced pattern shows satisfactory resynchronization type pacing  My thought that we should pace with biventricular pacing is necessary but if he has some is no intrinsic beats that would not be a problem and likely not be dyssynchrony-RBBB conduction     Problem List:  Patient Active Problem List   Diagnosis     Localized Primary Osteoarthritis Of The Right Shoulder     Persistent Atrial Fibrillation     Nonischemic cardiomyopathy     Hypothyroidism     Dyslipidemia     Lump In / On The Skin     Ganglion     Chest pain     Insomnia     Esophageal reflux     Adverse Effect Of Amiodarone     Normocytic normochromic anemia     Presence of cardiac resynchronization therapy defibrillator-Medtronic 3-lead     Medical History:  Past Medical History:   Diagnosis Date     Arrhythmia     Afib     Atrial fibrillation      Atrial flutter      Cardiomyopathy      CHF (congestive heart failure)      Disease of thyroid gland     Hypothyroid     Hyperlipidemia      Hypertension      Pacemaker      Peptic ulcer disease      Upper GI bleed     due to Duodenal ulcer from NSAID and Warfarin     Surgical History:  Past Surgical History:   Procedure Laterality Date     ABLATION OF DYSRHYTHMIC FOCUS       CARDIAC DEFIBRILLATOR PLACEMENT Left 01/2016    Medtronic     CARDIOVERSION  08/18/2016     INSERT / REPLACE / REMOVE PACEMAKER  7/14     MO ABLATE HEART DYSRHYTHM FOCUS      Description: Catheter Ablation Atrial Flutter;  Recorded: 07/11/2012;   Comments: 2005 for typical right isthmus dependent flutter     [facility] CARDIOVERSION ELECTIVE ARRHYTHMIA EXTERNAL  6/16/15; 7/13/15    Description: Elective Cardioversion External;  Recorded: 07/11/2012;  Comments: 5/18/12 for afib     pvi ablation  1/25/16     Social History:  Social History     Social History     Marital status: Single     Spouse name: N/A     Number of children: N/A     Years of education: N/A     Occupational History     Not on file.     Social History Main Topics     Smoking status: Never Smoker     Smokeless tobacco: Never Used     Alcohol use 1.8 oz/week     3 Cans of beer per week     Drug use: No     Sexual activity: Not on file      Comment: SINGLE      Other Topics Concern     Not on file     Social History Narrative       Review of Systems:      General: WNL  Eyes: WNL  Ears/Nose/Throat: WNL  Lungs: WNL  Heart: WNL  Stomach: WNL  Bladder: WNL  Muscle/Joints: WNL  Skin: WNL  Nervous System: WNL  Mental Health: WNL     Blood: WNL        Family History:  Family History   Problem Relation Age of Onset     Diabetes Mother      Colon cancer Sister      Cancer Sister      BLOOD CANCER         Allergies:  Allergies   Allergen Reactions     Amiodarone Other (See Comments)     Pulmonary toxicity     Amoxicillin Anaphylaxis     Multaq [Dronedarone] Other (See Comments)     Pt had similar response to Multaq as Amiodarone, c/o SOB and difficulty breathing       Medications:  Current Outpatient Prescriptions   Medication Sig Dispense Refill     acetaminophen (TYLENOL) 500 MG tablet Take 1,000 mg by mouth every 6 (six) hours as needed for pain.       ascorbic acid (VITAMIN C) 500 MG tablet Take 500 mg by mouth 2 (two) times a day.       carvedilol (COREG) 12.5 MG tablet TAKE ONE TABLET BY MOUTH TWICE A DAY WITH MEALS 180 tablet 0     furosemide (LASIX) 20 MG tablet TAKE TWO TABLETS BY MOUTH DAILY  180 tablet 3     irbesartan (AVAPRO) 150 MG tablet TAKE ONE TABLET BY MOUTH ONE TIME DAILY  90 tablet 2      "levothyroxine (SYNTHROID, LEVOTHROID) 125 MCG tablet TAKE ONE TABLET BY MOUTH EVERY MORNING 90 tablet 2     metoprolol succinate (TOPROL XL) 50 MG 24 hr tablet Take 1 tablet (50 mg total) by mouth daily. Stop Sotalol when starting. 180 tablet 3     multivitamin Liqd solution Take 5 mL by mouth every morning.        rivaroxaban (XARELTO) 20 mg Tab Take 1 tablet (20 mg total) by mouth daily with supper. 90 tablet 3     zolpidem (AMBIEN) 10 mg tablet TAKE 1 TABLET BY MOUTH AT BEDTIME AS NEEDED FOR SLEEP 30 tablet 2     GRAPE SEED EXTRACT ORAL Take 2 capsules by mouth daily.       omeprazole (PRILOSEC) 20 MG capsule TAKE 1 CAPSULE (20 MG TOTAL) BY MOUTH DAILY. 90 capsule 1     No current facility-administered medications for this visit.          Surgical site  The ICD is well-healed to left subclavicular site    Device interrogation  Chronic atrial fibrillation  76% biventricular pacing  1 short bursts of nonsustained ventricular tachycardia May 16, 2017, 8 beats otherwise no ventricular tachycardia  Some drops in R-wave sensing noted with automatic detection.  Today R-wave sensing is 11  No change in RV pacing threshold or impedance    Objective:   Vital signs:  /66 (Patient Site: Left Arm, Patient Position: Sitting, Cuff Size: Adult Large)  Pulse 80  Resp 18  Ht 6' 1.5\" (1.867 m)  Wt 195 lb (88.5 kg) Comment: with shoes  BMI 25.38 kg/m2      Physical Exam:      GENERAL APPEARANCE: Alert, cooperative and in no acute distress.  HEENT: No scleral icterus. No Xanthelasma. Oral mucous membranes pink and moist.  NECK: JVP normal cm. No Hepatojugular reflux. Thyroid not  Palpable  CHEST: clear to auscultation and percussion  CARDIOVASCULAR: S1, S2 without murmur    Brachial, radial  pulses are intact and symmetric.   No carotid bruits noted.  ABDOMEN: Non tender. BS+. No bruits.  EXTREMITIES: No cyanosis, clubbing or edema.    Lab Results:  LIPIDS:  Lab Results   Component Value Date    CHOL 221 (H) 07/07/2017    " CHOL 173 06/24/2014    CHOL 225 (H) 01/13/2014     Lab Results   Component Value Date    HDL 51 07/07/2017    HDL 52 06/24/2014    HDL 60 01/13/2014     Lab Results   Component Value Date    LDLCALC 141 (H) 07/07/2017    LDLCALC 106 06/24/2014    LDLCALC 143 (H) 01/13/2014     Lab Results   Component Value Date    TRIG 147 07/07/2017    TRIG 74 06/24/2014    TRIG 108 01/13/2014     No components found for: CHOLHDL    BMP:  Lab Results   Component Value Date    CREATININE 1.18 07/07/2017    BUN 29 (H) 07/07/2017     07/07/2017    K 4.7 07/07/2017     07/07/2017    CO2 28 07/07/2017         This note has been dictated using voice recognition software. Any grammatical or context distortions are unintentional and inherent to the software.  Orlin Barboza MD  [facility]  [phone]

## 2021-06-18 NOTE — LETTER
Letter by Steph Membreno EPS at      Author: Steph Membreno EPS Service: -- Author Type: --    Filed:  Encounter Date: 2/7/2019 Status: (Other)       Glenn LYNNE Leroyraven  [address]  [facility] [place] [address]      February 7, 2019      Dear Mr. Harperraven,    RE: Remote Results    We are writing to you regarding your recent Remote ICD check from home. Your transmission was received successfully. Battery status is satisfactory at this time.     Your results are within normal limits.    Your next device appointment will be a remote check on May 13th, 2019; this will occur automatically.    To schedule or reschedule, please call [phone] and press 1.    NOTE: If you would like to do an extra transmission, please call [phone] and press 3 to speak to a nurse BEFORE transmitting. This ensures that the Device Clinic staff is aware of the reason you are sending a transmission, and can follow-up with you after it has been reviewed.    We will be checking your implanted device from home (remotely) every three months unless otherwise instructed. We will need to see you in the clinic at least once a year. You may need to be seen in the clinic sooner depending on the results of your check.    Please be aware:    The follow-up schedule is like a Physician prescription.    Your remote monitor is paired to your specific implanted device.      Sincerely,    [facility] Device [facility]

## 2021-06-19 NOTE — LETTER
Letter by Louisa Garrido RDCS at      Author: Louisa Garrido RDCS Service: -- Author Type: --    Filed:  Encounter Date: 11/27/2019 Status: Signed         Glenn Caballero  [address]  [facility] [address]      November 27, 2019      Dear Mr. Caballero,    RE: Remote Results    We are writing to you regarding your recent Remote ICD check from home. Your transmission was received successfully. Battery status is satisfactory at this time.     Your results are showing no significant changes.    Your next device appointment will be a remote check on February 27, 2020; this will occur automatically.    To schedule or reschedule, please call [phone] and press 1.    NOTE: If you would like to do an extra transmission, please call [phone] and press 3 to speak to a nurse BEFORE transmitting. This ensures that the Device Clinic staff is aware of the reason you are sending a transmission, and can follow-up with you after it has been reviewed.    We will be checking your implanted device from home (remotely) every three months unless otherwise instructed. We will need to see you in the clinic at least once a year. You may need to be seen in the clinic sooner depending on the results of your check.    Please be aware:    The follow-up schedule is like a Physician prescription.    Your remote monitor is paired to your specific implanted device.      Sincerely,    [facility] Device [facility]

## 2021-06-19 NOTE — LETTER
Letter by Karley Butterfield at      Author: Karley Butterfield Service: -- Author Type: --    Filed:  Encounter Date: 5/13/2019 Status: (Other)         Glenn GUERA Caballero  [address]  [place] [facility] [address]      May 13, 2019      Dear Mr. Harperraven,    RE: Remote Results    We are writing to you regarding your recent Remote ICD check from home. Your transmission was received successfully. Battery status is satisfactory at this time.     Your results are within normal limits.    Your next device appointment will be a remote check on August 14, 2019; this will occur automatically.    To schedule or reschedule, please call [phone] and press 1.    NOTE: If you would like to do an extra transmission, please call [phone] and press 3 to speak to a nurse BEFORE transmitting. This ensures that the Device Clinic staff is aware of the reason you are sending a transmission, and can follow-up with you after it has been reviewed.    We will be checking your implanted device from home (remotely) every three months unless otherwise instructed. We will need to see you in the clinic at least once a year. You may need to be seen in the clinic sooner depending on the results of your check.    Please be aware:    The follow-up schedule is like a Physician prescription.    Your remote monitor is paired to your specific implanted device.      Sincerely,    [facility] Device [facility]

## 2021-06-20 ENCOUNTER — HEALTH MAINTENANCE LETTER (OUTPATIENT)
Age: 81
End: 2021-06-20

## 2021-06-20 NOTE — LETTER
Letter by Louisa Garrido RDCS at      Author: Louisa Garrido RDCS Service: -- Author Type: --    Filed:  Encounter Date: 2/27/2020 Status: (Other)         Glenn GUERA Caballero  [address]  [place] [address]      February 27, 2020      Dear Mr. Caballero,    RE: Remote Results    We are writing to you regarding your recent Remote ICD check from home. Your transmission was received successfully. Battery status is satisfactory at this time.     Your results are showing no significant changes.    Your next device appointment will be a clinic visit.  Please call in February to schedule.      To schedule or reschedule, please call [phone] and press 1.    NOTE: If you would like to do an extra transmission, please call [phone] and press 3 to speak to a nurse BEFORE transmitting. This ensures that the Device Clinic staff is aware of the reason you are sending a transmission, and can follow-up with you after it has been reviewed.    We will be checking your implanted device from home (remotely) every three months unless otherwise instructed. We will need to see you in the clinic at least once a year. You may need to be seen in the clinic sooner depending on the results of your check.    Please be aware:    The follow-up schedule is like a Physician prescription.    Your remote monitor is paired to your specific implanted device.      Sincerely,    [facility]        [FreeTextEntry1] : Venous duplex of left leg from 1 year ago shows no DVT.

## 2021-06-20 NOTE — LETTER
Letter by Qiana Main RN at      Author: Qiana Main RN Service: -- Author Type: --    Filed:  Encounter Date: 5/28/2020 Status: (Other)         Glenn Caballero  [address]  [place] [address]      May 28, 2020      Dear Mr. Caballero,    RE: Remote Results    We are writing to you regarding your recent Remote ICD check from home. Your transmission was received successfully. Battery status is satisfactory at this time.     Your results are being reviewed.  You will be contacted if further information is necessary.     Your next device appointment will be a remote check on August 27th, 2020; this will occur automatically.    To schedule or reschedule, please call [phone] and press 1.    NOTE: If you would like to do an extra transmission, please call [phone] and press 3 to speak to a nurse BEFORE transmitting. This ensures that the Device Clinic staff is aware of the reason you are sending a transmission, and can follow-up with you after it has been reviewed.    We will be checking your implanted device from home (remotely) every three months unless otherwise instructed. We will need to see you in the clinic at least once a year. You may need to be seen in the clinic sooner depending on the results of your check.    Please be aware:    The follow-up schedule is like a Physician prescription.    Your remote monitor is paired to your specific implanted device.      Sincerely,    [facility] Device [facility]

## 2021-06-20 NOTE — LETTER
Letter by Louisa Garrido RDCS at      Author: Louisa Garrido RDCS Service: -- Author Type: --    Filed:  Encounter Date: 5/8/2020 Status: (Other)         Glenn LYNNE Leroyraven  [address]  [place] [address]      May 8, 2020      Dear Mr. Caballero,    RE: Remote Results    We are writing to you regarding your recent Remote ICD check from home. Your transmission was received successfully. Battery status is satisfactory at this time.     Your results are being reviewed.  You will be contacted if further information is necessary.     Your next device appointment will be a remote check on August 11, 2020; this will occur automatically.    To schedule or reschedule, please call [phone] and press 1.    NOTE: If you would like to do an extra transmission, please call [phone] and press 3 to speak to a nurse BEFORE transmitting. This ensures that the Device Clinic staff is aware of the reason you are sending a transmission, and can follow-up with you after it has been reviewed.    We will be checking your implanted device from home (remotely) every three months unless otherwise instructed. We will need to see you in the clinic at least once a year. You may need to be seen in the clinic sooner depending on the results of your check.    Please be aware:    The follow-up schedule is like a Physician prescription.    Your remote monitor is paired to your specific implanted device.      Sincerely,    [facility]

## 2021-06-21 NOTE — PROGRESS NOTES
[facility] Note  Assessment:  Dilated congestive cardiomyopathy ejection fractions have been as low as 20%; more recently ejection fraction 35-37% by echocardiogram or nuclear scan         EF 39% Echocardiogram 5-2015        34 % by Echo        30-35% by echocardiogram September 15 2016       Ejection fraction 40%, echocardiogram November 8, 2018  Paroxysmal atrial fibrillation; prior CV=3       Cardioversion August 18 2016       Cardioversion 8-       Prior atrial flutter ablation         AF ablation 1--lots of atrial scar  Atrial fibrillation with rate control strategy May 2017  Sinus node dysfunction with AV block    Dual-chamber Medtronic pacemaker 7-  upgrade to CRTD 2- -Medtronic  No known coronary artery disease although nuclear scan did show a small fixed inferior lateral defect without reversibility-8/25/2014 ;   Normal coronary angiography 2005    Modest renal insufficiency with recent creatinine 1.4    Frequent PVCs about 42 per hour    Amiodarone toxicity with pulmonary insufficiency-severe    Chronic anticoagulation with Xarelto   Prior GI bleed felt related to nonsteroidal anti-inflammatories    Summary  11-      Severe left atrial enlargement    Left ventricle ejection fraction is moderately decreased. The calculated left ventricular ejection fraction is 40%. There is global hypokinesis with akinesis of the mid to basal inferior wall    Normal right ventricular size with decreased systolic function.    Mild mitral and tricuspid regurgitation. No evidence of pulmonary hypertension    When compared to the previous study dated 9/15/2016, the mid to basal inferior wall appears akinetic on the current study.     Reviewed laboratory evaluation from November 7, 2018; creatinine equals 1.06  Electrolytes are normal  Liver function studies normal  TSH normal  CBC normal    Plan:  You seem to be doing quite well from a heart standpoint  Echocardiogram  November 8, 2018 showed ejection fraction improved, now 40%  Comprehensive laboratory evaluation November 7, 2018 showed normal kidney function, liver panel was normal electrolytes were normal thyroid, B12 and CBC were all normal  Device interrogation is good, should have 2.1 years left on the battery  Alert tones were demonstrated today; and you could hear them   No abnormal rhythm pattern has been identified-he remained in atrial fibrillation  Pulses are good, no evidence for peripheral vascular disease  Your numbness may be from peripheral neuropathy or may be a reflection of spinal encroachment from your cervical degeneration; it is good that you are seeing a neurologist for further evaluation  Continue current cardiac medications  Have device checked every 3 months through himself on it monitoring  Return in 6-9 months or sooner as need arises      Subjective:    I had the opportunity to see.Glenn Caballero , who is a 78 y.o. male with a known history of complex heart disease  From heart standpoint P has been doing very well.  He has good endurance good exercise capacity-tells me his golf game is quite good  No chest pain no awareness of palpitations no syncope  Fluid level seem well controlled with no edema orthopnea PND  Is noted some numbness in his feet and had comprehensive laboratory evaluation that showed a normal liver panel, electrolytes renal function, B12  He did have CT scans of his lumbar area that showed a moderate amount of degeneration  CT scan of the cervical area showed considerable degeneration   But he is scheduled for a neurology evaluation  He does not have claudication  Does note that his feet get cold    No angina  Has adjusted his Lasix a bit, currently takes 40 mg once daily        Problem List:  Patient Active Problem List   Diagnosis     Localized Primary Osteoarthritis Of The Right Shoulder     Persistent Atrial Fibrillation     Nonischemic cardiomyopathy (H)     Hypothyroidism      Dyslipidemia     Lump In / On The Skin     Ganglion     Chest pain     Insomnia     Esophageal reflux     Adverse Effect Of Amiodarone     Normochromic normocytic anemia     Presence of cardiac resynchronization therapy defibrillator-Medtronic 3-lead     Family history of colon cancer     Medical History:  Past Medical History:   Diagnosis Date     Arrhythmia     Afib     Atrial fibrillation (H)      Atrial flutter (H)      Cardiomyopathy (H)      CHF (congestive heart failure) (H)      Disease of thyroid gland     Hypothyroid     Hyperlipidemia      Hypertension      Pacemaker      Peptic ulcer disease      Upper GI bleed     due to Duodenal ulcer from NSAID and Warfarin     Surgical History:  Past Surgical History:   Procedure Laterality Date     ABLATION OF DYSRHYTHMIC FOCUS       CARDIAC DEFIBRILLATOR PLACEMENT Left 01/2016    Medtronic     CARDIOVERSION  08/18/2016     INSERT / REPLACE / REMOVE PACEMAKER  7/14     [place] ABLATE HEART DYSRHYTHM FOCUS      Description: Catheter Ablation Atrial Flutter;  Recorded: 07/11/2012;  Comments: 2005 for typical right isthmus dependent flutter     [place] CARDIOVERSION ELECTIVE ARRHYTHMIA EXTERNAL  6/16/15; 7/13/15    Description: Elective Cardioversion External;  Recorded: 07/11/2012;  Comments: 5/18/12 for afib     pvi ablation  1/25/16     Social History:  Social History     Socioeconomic History     Marital status:      Spouse name: Not on file     Number of children: Not on file     Years of education: Not on file     Highest education level: Not on file   Social Needs     Financial resource strain: Not on file     Food insecurity - worry: Not on file     Food insecurity - inability: Not on file     Transportation needs - medical: Not on file     Transportation needs - non-medical: Not on file   Occupational History     Not on file   Tobacco Use     Smoking status: Never Smoker     Smokeless tobacco: Never Used   Substance and Sexual Activity     Alcohol use: Yes      Alcohol/week: 1.8 oz     Types: 3 Cans of beer per week     Drug use: No     Sexual activity: Not on file     Comment: SINGLE    Other Topics Concern     Not on file   Social History Narrative     Not on file       Review of Systems:      General: WNL  Eyes: WNL  Ears/Nose/Throat: WNL  Lungs: WNL  Heart: Arm Pain, Shortness of Breath with activity, Irregular Heartbeat(arm & leg numbness)  Stomach: WNL  Bladder: WNL  Muscle/Joints: Joint Pain  Skin: WNL  Nervous System: WNL  Mental Health: WNL     Blood: WNL        Family History:  Family History   Problem Relation Age of Onset     Diabetes Mother      Colon cancer Sister      Cancer Sister         BLOOD CANCER         Allergies:  Allergies   Allergen Reactions     Amiodarone Other (See Comments)     Pulmonary toxicity     Amoxicillin Anaphylaxis     Multaq [Dronedarone] Other (See Comments)     Pt had similar response to Multaq as Amiodarone, c/o SOB and difficulty breathing       Medications:  Current Outpatient Medications   Medication Sig Dispense Refill     acetaminophen (TYLENOL) 500 MG tablet Take 1,000 mg by mouth every 6 (six) hours as needed for pain.       ascorbic acid (VITAMIN C) 500 MG tablet Take 500 mg by mouth 2 (two) times a day.       carvedilol (COREG) 25 MG tablet Take 1 tablet (25 mg total) by mouth 2 (two) times a day with meals. 180 tablet 5     furosemide (LASIX) 20 MG tablet TAKE TWO TABLETS BY MOUTH DAILY  180 tablet 3     irbesartan (AVAPRO) 150 MG tablet TAKE ONE TABLET BY MOUTH ONE TIME DAILY 90 tablet 1     levothyroxine (SYNTHROID, LEVOTHROID) 125 MCG tablet TAKE ONE TABLET BY MOUTH EVERY MORNING 90 tablet 1     multivitamin Liqd solution Take 5 mL by mouth every morning.        rivaroxaban (XARELTO) 20 mg Tab Take 1 tablet (20 mg total) by mouth daily with supper. 90 tablet 3     zolpidem (AMBIEN) 10 mg tablet TAKE 1 TABLET BY MOUTH AT BEDTIME AS NEEDED FOR SLEEP 30 tablet 2     No current facility-administered medications for this  "visit.          Surgical site  Well-healed to left subclavicular region    Device interrogation  Chronic atrial fibrillation  73% biventricular pacing; not too concerned about the reduced amount of biventricular pacing since his underlying rhythm is right bundle branch block so not likely to have much dyssynchrony with intrinsic conduction  No ventricular tachycardia  Lead function satisfactory now pacing off electrode 3  Battery voltage good for 2.1 years  Alert tones were demonstrated patient could hear the tones  Minor adjustments were made to reduce energy drain    Objective:   Vital signs:  /72 (Patient Site: Right Arm, Patient Position: Sitting, Cuff Size: Adult Regular)   Pulse 80   Resp 16   Ht 6' 1\" (1.854 m)   Wt 190 lb (86.2 kg)   BMI 25.07 kg/m        Physical Exam:  Blood pressure 110 sitting, 120 standing  Heart rate 70 and regular with occasional irregularity  Pulses are strong with no bruits  Good pedal pulses right and left can easily palpate posterior tibial dorsalis pedis    GENERAL APPEARANCE: Alert, cooperative and in no acute distress.  HEENT: No scleral icterus. No Xanthelasma. Oral mucous membranes pink and moist.  NECK: JVP normal cm. No Hepatojugular reflux. Thyroid not  Palpable  CHEST: clear to auscultation and percussion  CARDIOVASCULAR: S1, S2 without murmur    Brachial, radial  pulses are intact and symmetric.   No carotid bruits noted.  ABDOMEN: Non tender. BS+. No bruits.  EXTREMITIES: No cyanosis, clubbing or edema.    Lab Results:  LIPIDS:  Lab Results   Component Value Date    CHOL 221 (H) 07/07/2017    CHOL 173 06/24/2014    CHOL 225 (H) 01/13/2014     Lab Results   Component Value Date    HDL 51 07/07/2017    HDL 52 06/24/2014    HDL 60 01/13/2014     Lab Results   Component Value Date    LDLCALC 141 (H) 07/07/2017    LDLCALC 106 06/24/2014    LDLCALC 143 (H) 01/13/2014     Lab Results   Component Value Date    TRIG 147 07/07/2017    TRIG 74 06/24/2014    TRIG 108 " 01/13/2014     No components found for: CHOLHDL    BMP:  Lab Results   Component Value Date    CREATININE 1.06 11/07/2018    BUN 19 11/07/2018     11/07/2018    K 4.3 11/07/2018     11/07/2018    CO2 27 11/07/2018         This note has been dictated using voice recognition software. Any grammatical or context distortions are unintentional and inherent to the software.  Orlin Barboza MD  [facility]  [phone]

## 2021-06-21 NOTE — PROGRESS NOTES
Assessment/Plan:    1. Paresthesias  Paresthesias nonspecific.  Intermittent lower extremity concerns.  Check B12 level and comprehensive metabolic panel.  Ensure stable CBC and TSH while on thyroid replacement.  Referral was placed to see neurologist.  - Vitamin B12  - Comprehensive Metabolic Panel  - Ambulatory referral to Neurology    2. Hypothyroidism, unspecified type  Continues levothyroxine 125 mcg daily.  - Thyroid Stimulating Hormone (TSH)    3. Normochromic normocytic anemia  CBC pending with prior  with mild normochromic normocytic anemia with hemoglobin 13.0 per  - HM2(CBC w/o Differential)    4. Nonischemic cardiomyopathy (H)  History of nonischemic cardiomyopathy.  Has upcoming echocardiogram.  Patient will decrease furosemide from 40 mg down to 20 mg daily over next week until seen stating improvement and paresthesias without peripheral edema concerns.    5. Family history colon cancer in patient's sister  Referral was placed for repeat colonoscopy.        Subjective:    Glenn Caballero is seen today for CT result follow-up based on recent paresthesias described.  Symptoms over past 2 months.  Intermittent.  Occasional concern and upper arms involving fingers curling up.  Last a couple minutes then resolved.  Bilateral.  Occurred once at work as well.  Now has occasional lower extremity numbness in feet and lower legs bilateral symmetric.  Comes and goes.  Feels somewhat better by cutting back from 40 mg down to 20 mg on furosemide.  Had been evaluated through emergency department September 26, 2018 by Dr. Ramírez.  Head CT without acute findings.  Mild age-related change.  Cervical spine CT without fracture or subluxation.  Advanced degenerative changes C3-C4 through C7-T1.  Lumbar CT with moderate L4-5 and L5-S1 degenerative changes.  No peripheral edema issues.  History of nonischemic cardiomyopathy with prior ejection fraction 34% on echocardiogram October 2015.  No chest pain.  Comprehensive  review of systems as above otherwise all negative.      No children  Tobacco: quit after teenager  EtOH: occ beer now and then  Mom -  85 diabetes  Dad -  MVA  2 bros -   3 sis - one  due to colon CA; youngest sis with end-stage leukemia  Surgeries: pacemaker; cardiac ablation  Hospitalizations: a. 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  Hobbies: golf    Past Surgical History:   Procedure Laterality Date     ABLATION OF DYSRHYTHMIC FOCUS       CARDIAC DEFIBRILLATOR PLACEMENT Left 2016    Medtronic     CARDIOVERSION  2016     INSERT / REPLACE / REMOVE PACEMAKER       NE ABLATE HEART DYSRHYTHM FOCUS      Description: Catheter Ablation Atrial Flutter;  Recorded: 2012;  Comments:  for typical right isthmus dependent flutter     NE CARDIOVERSION ELECTIVE ARRHYTHMIA EXTERNAL  6/16/15; 7/13/15    Description: Elective Cardioversion External;  Recorded: 2012;  Comments: 13 for afib     pvi ablation  16        Family History   Problem Relation Age of Onset     Diabetes Mother      Colon cancer Sister      Cancer Sister      BLOOD CANCER        Past Medical History:   Diagnosis Date     Arrhythmia     Afib     Atrial fibrillation (H)      Atrial flutter (H)      Cardiomyopathy (H)      CHF (congestive heart failure) (H)      Disease of thyroid gland     Hypothyroid     Hyperlipidemia      Hypertension      Pacemaker      Peptic ulcer disease      Upper GI bleed     due to Duodenal ulcer from NSAID and Warfarin        Social History   Substance Use Topics     Smoking status: Never Smoker     Smokeless tobacco: Never Used     Alcohol use 1.8 oz/week     3 Cans of beer per week        Current Outpatient Prescriptions   Medication Sig Dispense Refill     acetaminophen (TYLENOL) 500 MG tablet Take 1,000 mg by mouth every 6 (six) hours as needed for pain.        ascorbic acid (VITAMIN C) 500 MG tablet Take 500 mg by mouth 2 (two) times a day.       carvedilol (COREG) 25 MG tablet Take 1 tablet (25 mg total) by mouth 2 (two) times a day with meals. 180 tablet 5     furosemide (LASIX) 20 MG tablet TAKE TWO TABLETS BY MOUTH DAILY  180 tablet 3     irbesartan (AVAPRO) 150 MG tablet TAKE ONE TABLET BY MOUTH ONE TIME DAILY 90 tablet 1     levothyroxine (SYNTHROID, LEVOTHROID) 125 MCG tablet TAKE ONE TABLET BY MOUTH EVERY MORNING 90 tablet 1     multivitamin Liqd solution Take 5 mL by mouth every morning.        rivaroxaban (XARELTO) 20 mg Tab Take 1 tablet (20 mg total) by mouth daily with supper. 90 tablet 3     zolpidem (AMBIEN) 10 mg tablet TAKE 1 TABLET BY MOUTH AT BEDTIME AS NEEDED FOR SLEEP 30 tablet 2     No current facility-administered medications for this visit.           Objective:    Vitals:    11/07/18 0919   BP: 130/60   Pulse: 79   SpO2: 98%   Weight: 192 lb (87.1 kg)      Body mass index is 25.33 kg/(m^2).    Alert.  No apparent distress.  Bilateral lower extremities with 2+ DTRs bilateral patella.  1+ Achilles DTRs symmetric.  Monofilament testing normal.  No peripheral edema.  Dorsalis pedis and posterior tibial pulses intact.  No rash.        TTE procedure:ECHO COMPLETE.  Procedure Date  Date: 09/15/2016 Start: 08:55 AM     Study Location: [facility]  Technical Quality: Good visualization     Patient Status: Routine     Height: 73 inches Weight: 185 pounds BSA: 2.08 m^2 BMI: 24.41 kg/m^2     HR: 82 bpm BP: 130/80 mmHg     Allergies    - Ampicillin.       - Other:(amiodarone).     Indications  Atrial fibrillation.      Conclusions       Summary   Mildly dilated left ventricle.   Left ventricular ejection fraction is visually estimated to be 30-35%%.   Global hypokinesis of the left ventricle with severe impairment of   systolic function.   Pacer wire visualized in right ventricle.   Moderate biatrial enlargement.   Mild mitral regurgitation.   When compared to  the previous echocardiogram dated 4/19/16 , left   ventricular systolic function has slightly improved.      This note has been dictated using voice recognition software and as a result may contain minor grammatical errors and unintended word substitutions.

## 2021-06-23 NOTE — TELEPHONE ENCOUNTER
Controlled Substance Refill Request  Medication:   Requested Prescriptions     Pending Prescriptions Disp Refills     zolpidem (AMBIEN) 10 mg tablet [Pharmacy Med Name: Zolpidem Tartrate Oral Tablet 10 MG] 30 tablet 1     Sig: TAKE 1 TABLET BY MOUTH AT BEDTIME AS NEEDED FOR SLEEP     Date Last Fill: 11/5/18   #30 R-2  Pharmacy:  [facility]  Submit electronically to pharmacy  Controlled Substance Agreement on File:   Encounter-Level CSA Scan Date:    There are no encounter-level csa scan date.       Last office visit: 11/7/2018 Collin Blanco MD Katie Beck RN Triage [facility]

## 2021-06-23 NOTE — TELEPHONE ENCOUNTER
Refill Approved    Rx renewed per Medication Renewal Policy. Medication was last renewed on 7/19/18 .    Gaviota Barakat, [facility] Triage/Med Refill 1/19/2019     Requested Prescriptions   Pending Prescriptions Disp Refills     levothyroxine (SYNTHROID, LEVOTHROID) 125 MCG tablet [Pharmacy Med Name: Levothyroxine Sodium Oral Tablet 125 MCG] 90 tablet 0     Sig: TAKE ONE TABLET BY MOUTH EVERY MORNING    Thyroid Hormones Protocol Passed - 1/19/2019  9:50 AM       Passed - Provider visit in past 12 months or next 3 months    Last office visit with prescriber/PCP: 11/7/2018 Collin Blanco MD OR same dept: 11/7/2018 Collin Blanco MD OR same specialty: 11/7/2018 Collin Blanco MD  Last physical: Visit date not found Last MTM visit: Visit date not found   Next visit within 3 mo: Visit date not found  Next physical within 3 mo: Visit date not found  Prescriber OR PCP: Collin Blanco MD  Last diagnosis associated with med order: 1. Hypothyroid  - levothyroxine (SYNTHROID, LEVOTHROID) 125 MCG tablet [Pharmacy Med Name: Levothyroxine Sodium Oral Tablet 125 MCG]; TAKE ONE TABLET BY MOUTH EVERY MORNING  Dispense: 90 tablet; Refill: 0    If protocol passes may refill for 12 months if within 3 months of last provider visit (or a total of 15 months).            Passed - TSH on file in past 12 months for patient age 12 & older    TSH   Date Value Ref Range Status   11/07/2018 1.92 0.30 - 5.00 uIU/mL Final

## 2021-06-24 NOTE — TELEPHONE ENCOUNTER
RN cannot approve Refill Request    RN can NOT refill this medication med is not covered by policy/route to provider. Last office visit: 11/7/2018 Collin Blanco MD Last Physical: Visit date not found Last MTM visit: Visit date not found Last visit same specialty: 11/7/2018 Collin Blanco MD.  Next visit within 3 mo: Visit date not found  Next physical within 3 mo: Visit date not found      Gaviota Barakat, [facility] Connection Triage/Med Refill 3/9/2019    Requested Prescriptions   Pending Prescriptions Disp Refills     tobramycin (TOBREX) 0.3 % ophthalmic solution [Pharmacy Med Name: Tobramycin Ophthalmic Solution 0.3 %] 5 mL 0     Sig: Administer 1 drop to both eyes every 4 (four) hours for 7 days.    There is no refill protocol information for this order

## 2021-06-26 NOTE — PROGRESS NOTES
Progress Notes by Orlin Barboza MD at 4/14/2018  3:22 PM     Author: Orlin Barboza MD Service: -- Author Type: Physician    Filed: 4/14/2018  3:22 PM Encounter Date: 4/14/2018 Status: Signed    : Orlin Barboza MD (Physician)       Glenn Barboza MD        Phone Number: [phone]                     Is there any risk to using an inversion table with my pacemaker?      Plan  OK for inversion table with device

## 2021-06-28 NOTE — PROGRESS NOTES
"Progress Notes by Cassi Shin CNP at 4/1/2020  9:10 AM     Author: Cassi Shin CNP Service: -- Author Type: Nurse Practitioner    Filed: 4/1/2020  1:56 PM Encounter Date: 4/1/2020 Status: Signed    : Cassi Shin CNP (Nurse Practitioner)           The patient has been notified of following:     \"This telephone visit will be conducted via a call between you and your physician/provider. We have found that certain health care needs can be provided without the need for a physical exam.  This service lets us provide the care you need with a phone conversation.  If a prescription is necessary we can send it directly to your pharmacy.  If lab work is needed we can place an order for that and you can then stop by our lab to have the test done at a later time. If during the course of the call the physician/provider feels a telephone visit is not appropriate, you will not be charged for this service.\" Verbal consent has been obtained for this service by care team member:         HEART CARE PHONE ENCOUNTER        The patient has chosen to have the visit conducted as a telephone visit, to reduce risk of exposure given the current status of Coronavirus in our community. This telephone visit is being conducted via a call between the patient and physician/provider. Health care needs are being provided without a physical exam.     Assessment/Recommendations   Assessment:    1. Nonischemic cardiomyopathy, heart failure with reduced ejection fraction, ejection fraction 25 %, NYHA class III: He has decreased energy and shortness of breath with minimal activity.  He has minimal edema in his ankles.  Weight has remained stable.  We reviewed heart failure diagnosis but he has a good understanding of the disease.  He is following a low-sodium diet.  Dr. Barboza has prescribed eplerenone.  Glen will start this medication on Friday, April 3.  Prescription is not currently available at his " pharmacy.    2.  Persistent atrial fibrillation: He continues to take Xarelto with a full meal.      Plan:  1.   Heart failure medications:  - Beta blocker therapy with carvedilol 25 mg twice daily  - ARB therapy with irbesartan 150 mg daily  - Aldosterone blocker therapy with eplerenone 25 mg daily starting April 3.  - Diuretic therapy with furosemide 40 mg daily  2.  BMP scheduled for next week  3.  Phone visit with me in 1 to 2 weeks to discuss initiation of Entresto.  4.  He was asked to check his blood pressure and weights daily    I have reviewed the note as documented.  This accurately captures the substance of my conversation with the patient.    Total time of call between patient and provider was 14 minutes   Start Time: 9:22   Stop Time: 9:36       History of Present Illness/Subjective    Glenn Caballero is a 79 y.o. male who is being evaluated via a billable telephone visit.  He has a history of nonischemic cardiomyopathy, heart failure with reduced ejection fraction, CRT-D, persistent atrial fibrillation, and dyslipidemia.  Heart failure with reduced ejection fraction dates back to at least 2014 when ejection fraction was 36%.  In 2018, his ejection fraction had improved to 40%.  Glen has had increased shortness of breath and decreased energy since summer 2019.  Work-up at primary care office was negative.  Echocardiogram on March 11, 2020 showed ejection fraction of 25%, mild tricuspid regurgitation, and mild to moderate mitral regurgitation.  Dr. Barboza has prescribed eplerenone and is recommending Entresto.  He was referred to heart failure clinic for optimization of his medications.    Glen describes shortness of breath with minimal activity.  He does recover from shortness of breath quickly.  He has decreased energy.  He denies orthopnea, lightheadedness, or chest pain.  He previously had edema in his ankles but this has improved.  He was on spironolactone for about a week which improved edema but  caused joint, hand, and back pain.  Spironolactone was discontinued due to the side effects.      His home weight has been stable around 189 pounds.  He checks his blood pressure occasionally which is typically in the 110s to 120s systolically.  He follows a low-sodium diet.      ECHO:   Results for orders placed during the hospital encounter of 03/11/20   Echo Complete [ECH10] 03/11/2020    Narrative   The estimated left ventricular ejection fraction is 25%. This represents   a severely decreased ejection fraction. Cavity is mildly increased.    Normal right ventricular size. The systolic function is mildly reduced.    Severe biatrial enlargement    Mild tricuspid valve regurgitation. Mild pulmonary hypertension present.    Mild to moderate mitral regurgitation.    When compared to the previous study dated 11/8/2018, left ventricular   dilatation with decrease in systolic function is demonstrated.            I have reviewed and updated the patient's Past Medical History, Social History, Family History and Medication List.     Physical Examination not performed given phone encounter Review of Systems                                                Medical History  Surgical History Family History Social History   Past Medical History:   Diagnosis Date   ? Arrhythmia     Afib   ? Atrial fibrillation (H)    ? Atrial flutter (H)    ? Cardiomyopathy (H)    ? CHF (congestive heart failure) (H)    ? Disease of thyroid gland     Hypothyroid   ? Hyperlipidemia    ? Hypertension    ? Pacemaker    ? Peptic ulcer disease    ? Upper GI bleed     due to Duodenal ulcer from NSAID and Warfarin    Past Surgical History:   Procedure Laterality Date   ? ABLATION OF DYSRHYTHMIC FOCUS     ? CARDIAC DEFIBRILLATOR PLACEMENT Left 01/2016    [employer]   ? CARDIOVERSION  08/18/2016   ? INSERT / REPLACE / REMOVE PACEMAKER  7/14   ? [place] ABLATE HEART DYSRHYTHM FOCUS      Description: Catheter Ablation Atrial Flutter;  Recorded: 07/11/2012;   Comments: 2005 for typical right isthmus dependent flutter   ? [place] CARDIOVERSION ELECTIVE ARRHYTHMIA EXTERNAL  6/16/15; 7/13/15    Description: Elective Cardioversion External;  Recorded: 07/11/2012;  Comments: 5/18/12 for afib   ? pvi ablation  1/25/16    Family History   Problem Relation Age of Onset   ? Diabetes Mother    ? Colon cancer Sister    ? Cancer Sister         BLOOD CANCER    Social History     Socioeconomic History   ? Marital status:      Spouse name: Not on file   ? Number of children: Not on file   ? Years of education: Not on file   ? Highest education level: Not on file   Occupational History   ? Not on file   Social Needs   ? Financial resource strain: Not on file   ? Food insecurity     Worry: Not on file     Inability: Not on file   ? Transportation needs     Medical: Not on file     Non-medical: Not on file   Tobacco Use   ? Smoking status: Never Smoker   ? Smokeless tobacco: Never Used   Substance and Sexual Activity   ? Alcohol use: Yes     Alcohol/week: 3.0 standard drinks     Types: 3 Cans of beer per week   ? Drug use: No   ? Sexual activity: Not on file     Comment: SINGLE    Lifestyle   ? Physical activity     Days per week: Not on file     Minutes per session: Not on file   ? Stress: Not on file   Relationships   ? Social connections     Talks on phone: Not on file     Gets together: Not on file     Attends Jehovah's witness service: Not on file     Active member of club or organization: Not on file     Attends meetings of clubs or organizations: Not on file     Relationship status: Not on file   ? Intimate partner violence     Fear of current or ex partner: Not on file     Emotionally abused: Not on file     Physically abused: Not on file     Forced sexual activity: Not on file   Other Topics Concern   ? Not on file   Social History Narrative   ? Not on file          Medications  Allergies   Current Outpatient Medications   Medication Sig Dispense Refill   ? acetaminophen (TYLENOL) 500  MG tablet Take 1,000 mg by mouth every 6 (six) hours as needed for pain.     ? ascorbic acid (VITAMIN C) 500 MG tablet Take 500 mg by mouth 2 (two) times a day.     ? carvedilol (COREG) 25 MG tablet TAKE ONE TABLET BY MOUTH TWICE DAILY with meals 180 tablet 2   ? citalopram (CELEXA) 10 MG tablet Take 1 tablet (10 mg total) by mouth daily. 30 tablet 2   ? eplerenone (INSPRA) 25 MG tablet Take 1 tablet (25 mg total) by mouth daily. 90 tablet 1   ? escitalopram oxalate (LEXAPRO) 10 MG tablet Take 10 mg by mouth daily.     ? furosemide (LASIX) 40 MG tablet Take 1 tablet (40 mg total) by mouth daily. 90 tablet 1   ? hydrOXYzine pamoate (VISTARIL) 50 MG capsule Take 1 capsule (50 mg total) by mouth 3 (three) times a day as needed for itching. 90 capsule 0   ? irbesartan (AVAPRO) 150 MG tablet Take 1 tablet (150 mg total) by mouth daily. 90 tablet 2   ? levothyroxine (SYNTHROID, LEVOTHROID) 125 MCG tablet Take 1 tablet (125 mcg total) by mouth every morning. 90 tablet 3   ? multivitamin Liqd solution Take 5 mL by mouth every morning.      ? XARELTO 20 mg tablet Take 1 tablet (20 mg total) by mouth daily with supper. 90 tablet 1   ? zolpidem (AMBIEN) 10 mg tablet Take 1 tablet (10 mg total) by mouth at bedtime as needed for sleep. 30 tablet 5     No current facility-administered medications for this visit.     Allergies   Allergen Reactions   ? Amiodarone Other (See Comments)     Pulmonary toxicity   ? Amoxicillin Anaphylaxis   ? Multaq [Dronedarone] Other (See Comments)     Pt had similar response to Multaq as Amiodarone, c/o SOB and difficulty breathing         Lab Results    Chemistry/lipid CBC Cardiac Enzymes/BNP/TSH/INR   Lab Results   Component Value Date    CHOL 221 (H) 07/07/2017    HDL 51 07/07/2017    LDLCALC 141 (H) 07/07/2017    TRIG 147 07/07/2017    CREATININE 1.36 (H) 09/19/2019    BUN 32 (H) 09/19/2019    K 5.2 (H) 09/19/2019     09/19/2019     09/19/2019    CO2 25 09/19/2019    Lab Results    Component Value Date    WBC 4.8 09/19/2019    HGB 14.3 09/19/2019    HCT 42.6 09/19/2019    MCV 98 09/19/2019     09/19/2019    Lab Results   Component Value Date    CKTOTAL 218 (H) 07/24/2019    TROPONINI 0.02 08/25/2014     (H) 06/26/2019    TSH 0.72 06/26/2019    INR 1.80 (!) 06/11/2014

## 2021-06-28 NOTE — PROGRESS NOTES
Progress Notes by Orlin Barboza MD at 3/30/2020 11:23 AM     Author: Orlin Barboza MD Service: -- Author Type: Physician    Filed: 3/30/2020 11:28 AM Encounter Date: 3/30/2020 Status: Signed    : Orlin Barboza MD (Physician)       Glen Caballero Gregory A, MD    Phone Number: [phone]               By now you have seen my ECCO results, my EF is down to 25 from 40.   This is why I have such low energy.   Is there any heart medication I could be taking that would help me in   this regard. I'm not schedualed to see you until May 11th.   Glen Caballero      Echocardiogram March 12, 2020 showed ejection fraction 25%-does have a long history of nonischemic cardiomyopathy  Chest x-ray February 18, 2020 showed biventricular defibrillator system  Most posterior electrodes are 3-4  Previously did do some EKG optimization could not find any improvement with adjustments  Seems to be a candidate for Entresto  Previously had adverse reaction to spironolactone  Plan   And Inspra 25 mg/day  BMP 1 week after starting inspra   We will ask heart failure clinic to make appropriate adjustments switching from Avapro to Entresto

## 2021-06-29 NOTE — PROGRESS NOTES
"Progress Notes by Cassi Shin CNP at 5/5/2020  9:50 AM     Author: Cassi Shin CNP Service: -- Author Type: Nurse Practitioner    Filed: 5/5/2020 10:18 AM Encounter Date: 5/5/2020 Status: Signed    : Cassi Shin CNP (Nurse Practitioner)           The patient has been notified of following:     \"This telephone visit will be conducted via a call between you and your physician/provider. We have found that certain health care needs can be provided without the need for a physical exam.  This service lets us provide the care you need with a phone conversation.  If a prescription is necessary we can send it directly to your pharmacy.  If lab work is needed we can place an order for that and you can then stop by our lab to have the test done at a later time. If during the course of the call the physician/provider feels a telephone visit is not appropriate, you will not be charged for this service.\" Verbal consent has been obtained for this service by care team member:         HEART CARE PHONE ENCOUNTER        The patient has chosen to have the visit conducted as a telephone visit, to reduce risk of exposure given the current status of Coronavirus in our community. This telephone visit is being conducted via a call between the patient and physician/provider. Health care needs are being provided without a physical exam.     Assessment/Recommendations   Assessment:    1. Nonischemic cardiomyopathy, heart failure with reduced ejection fraction, ejection fraction 25 %, NYHA class III: He developed a rash and swelling of his throat with eplerenone.  He no longer has swelling and rash is mostly gone.  We previously discussed starting Entresto but wanted to wait for reaction to eplerenone to resolve.    2.  Persistent atrial fibrillation: He continues to take Xarelto with a full meal.      Plan:  1.   Heart failure medications:  - Beta blocker therapy with carvedilol 25 mg twice daily  - " ARB therapy with irbesartan 150 mg daily discontinued.  Start Entresto 24/26 mg twice daily  - Diuretic therapy with furosemide 40 mg daily.    2.  He will start Entresto this week when he picks up from the pharmacy.  3.  He will monitor blood pressure daily.  4.  BMP 1 week after starting Entresto.  He will call primary care office to schedule.  5.  Continue daily weights and low-sodium diet    Follow-up with Dr. Barboza on May 11.  Follow-up virtual visit with me in 4 weeks.  Consider further Entresto titration at that time.  I have reviewed the note as documented.  This accurately captures the substance of my conversation with the patient.    Total time of call between patient and provider was 13 minutes   Start Time: 9:46  Stop Time: 9:59       History of Present Illness/Subjective    Glenn Caballero is a 79 y.o. male who is being evaluated via a billable telephone visit.  He has a history of nonischemic cardiomyopathy, heart failure with reduced ejection fraction, CRT-D, persistent atrial fibrillation, and dyslipidemia.  Heart failure with reduced ejection fraction dates back to at least 2014 when ejection fraction was 36%.  In 2018, his ejection fraction had improved to 40%.  Glen has had increased shortness of breath and decreased energy since summer 2019.  Work-up at primary care office was negative.  Echocardiogram on March 11, 2020 showed ejection fraction of 25%, mild tricuspid regurgitation, and mild to moderate mitral regurgitation.  Dr. Barboza has prescribed eplerenone and is recommending Entresto.  He was referred to heart failure clinic for optimization of his medications.    Glen did not tolerate eplerenone.  He developed a rash which is now mostly gone.  He continues to have shortness of breath with activity and decreased energy levels.  He has noticed worsening symptoms over the past few months.  He denies any symptoms of acute fluid retention.  He denies orthopnea, edema, or weight gain.  His  weight is now at 195 pounds.  Glen is requesting to start Entresto which we previously discussed.      ECHO:   Results for orders placed during the hospital encounter of 03/11/20   Echo Complete [ECH10] 03/11/2020    Narrative   The estimated left ventricular ejection fraction is 25%. This represents   a severely decreased ejection fraction. Cavity is mildly increased.    Normal right ventricular size. The systolic function is mildly reduced.    Severe biatrial enlargement    Mild tricuspid valve regurgitation. Mild pulmonary hypertension present.    Mild to moderate mitral regurgitation.    When compared to the previous study dated 11/8/2018, left ventricular   dilatation with decrease in systolic function is demonstrated.            I have reviewed and updated the patient's Past Medical History, Social History, Family History and Medication List.     Physical Examination not performed given phone encounter Review of Systems                                                Medical History  Surgical History Family History Social History   Past Medical History:   Diagnosis Date   ? Arrhythmia     Afib   ? Atrial fibrillation (H)    ? Atrial flutter (H)    ? Cardiomyopathy (H)    ? CHF (congestive heart failure) (H)    ? Disease of thyroid gland     Hypothyroid   ? Hyperlipidemia    ? Hypertension    ? Pacemaker    ? Peptic ulcer disease    ? Upper GI bleed     due to Duodenal ulcer from NSAID and Warfarin    Past Surgical History:   Procedure Laterality Date   ? ABLATION OF DYSRHYTHMIC FOCUS     ? CARDIAC DEFIBRILLATOR PLACEMENT Left 01/2016    [employer]   ? CARDIOVERSION  08/18/2016   ? INSERT / REPLACE / REMOVE PACEMAKER  7/14   ? [place] ABLATE HEART DYSRHYTHM FOCUS      Description: Catheter Ablation Atrial Flutter;  Recorded: 07/11/2012;  Comments: 2005 for typical right isthmus dependent flutter   ? [place] CARDIOVERSION ELECTIVE ARRHYTHMIA EXTERNAL  6/16/15; 7/13/15    Description: Elective Cardioversion External;   Recorded: 07/11/2012;  Comments: 5/18/12 for afib   ? pvi ablation  1/25/16    Family History   Problem Relation Age of Onset   ? Diabetes Mother    ? Colon cancer Sister    ? Cancer Sister         BLOOD CANCER    Social History     Socioeconomic History   ? Marital status:      Spouse name: Not on file   ? Number of children: Not on file   ? Years of education: Not on file   ? Highest education level: Not on file   Occupational History   ? Not on file   Social Needs   ? Financial resource strain: Not on file   ? Food insecurity     Worry: Not on file     Inability: Not on file   ? Transportation needs     Medical: Not on file     Non-medical: Not on file   Tobacco Use   ? Smoking status: Never Smoker   ? Smokeless tobacco: Never Used   Substance and Sexual Activity   ? Alcohol use: Yes     Alcohol/week: 3.0 standard drinks     Types: 3 Cans of beer per week   ? Drug use: No   ? Sexual activity: Not on file     Comment: SINGLE    Lifestyle   ? Physical activity     Days per week: Not on file     Minutes per session: Not on file   ? Stress: Not on file   Relationships   ? Social connections     Talks on phone: Not on file     Gets together: Not on file     Attends Restorationist service: Not on file     Active member of club or organization: Not on file     Attends meetings of clubs or organizations: Not on file     Relationship status: Not on file   ? Intimate partner violence     Fear of current or ex partner: Not on file     Emotionally abused: Not on file     Physically abused: Not on file     Forced sexual activity: Not on file   Other Topics Concern   ? Not on file   Social History Narrative   ? Not on file          Medications  Allergies   Current Outpatient Medications   Medication Sig Dispense Refill   ? acetaminophen (TYLENOL) 500 MG tablet Take 1,000 mg by mouth every 6 (six) hours as needed for pain.     ? ascorbic acid (VITAMIN C) 500 MG tablet Take 500 mg by mouth 2 (two) times a day.     ?  carvedilol (COREG) 25 MG tablet TAKE ONE TABLET BY MOUTH TWICE DAILY with meals 180 tablet 2   ? citalopram (CELEXA) 10 MG tablet Take 1 tablet (10 mg total) by mouth daily. 30 tablet 2   ? furosemide (LASIX) 40 MG tablet Take 1 tablet (40 mg total) by mouth daily. 90 tablet 1   ? levothyroxine (SYNTHROID, LEVOTHROID) 125 MCG tablet Take 1 tablet (125 mcg total) by mouth every morning. 90 tablet 3   ? multivitamin Liqd solution Take 5 mL by mouth every morning.      ? XARELTO 20 mg tablet Take 1 tablet (20 mg total) by mouth daily with supper. 90 tablet 1   ? zolpidem (AMBIEN) 10 mg tablet Take 1 tablet (10 mg total) by mouth at bedtime as needed for sleep. 30 tablet 5   ? sacubitriL-valsartan (ENTRESTO) 24-26 mg Tab tablet Take 1 tablet by mouth 2 (two) times a day. 60 tablet 11     No current facility-administered medications for this visit.     Allergies   Allergen Reactions   ? Amiodarone Other (See Comments)     Pulmonary toxicity   ? Amoxicillin Anaphylaxis   ? Inspra [Eplerenone] Hives     Throat swelling and hives   ? Multaq [Dronedarone] Other (See Comments)     Pt had similar response to Multaq as Amiodarone, c/o SOB and difficulty breathing   ? Spironolactone      Hand swelling, back pain         Lab Results    Chemistry/lipid CBC Cardiac Enzymes/BNP/TSH/INR   Lab Results   Component Value Date    CHOL 221 (H) 07/07/2017    HDL 51 07/07/2017    LDLCALC 141 (H) 07/07/2017    TRIG 147 07/07/2017    CREATININE 1.37 (H) 04/13/2020    BUN 20 04/13/2020    K 4.5 04/13/2020     04/13/2020    CL 98 04/13/2020    CO2 31 04/13/2020    Lab Results   Component Value Date    WBC 6.5 04/13/2020    HGB 15.0 04/13/2020    HCT 44.2 04/13/2020    MCV 99 04/13/2020     04/13/2020    Lab Results   Component Value Date    CKTOTAL 218 (H) 07/24/2019    TROPONINI 0.02 08/25/2014     (H) 06/26/2019    TSH 4.68 04/13/2020    INR 1.80 (!) 06/11/2014

## 2021-06-29 NOTE — PROGRESS NOTES
Progress Notes by Qiana Main RN at 5/8/2020 10:38 AM     Author: Qiana Main RN Service: -- Author Type: Registered Nurse    Filed: 5/8/2020 11:32 AM Encounter Date: 5/8/2020 Status: Signed    : Qiana Main RN (Registered Nurse)       Cassi Shin, Qiana Ponce, RN               Thank you for the update.  I have an appointment with him in a few weeks.  Can you please do a remote [employer]iVol check before my next appointment?     Thanks      Above noted. Repeat remote scheduled for 6/2/2020 to recheck OptiVol.    Qiana Main, JUAN C

## 2021-06-29 NOTE — PROGRESS NOTES
"Progress Notes by Cassi Shin CNP at 5/29/2020  8:30 AM     Author: Cassi Shin CNP Service: -- Author Type: Nurse Practitioner    Filed: 5/29/2020  9:09 AM Encounter Date: 5/29/2020 Status: Signed    : Cassi Shin CNP (Nurse Practitioner)           The patient has been notified of following:     \"This telephone visit will be conducted via a call between you and your physician/provider. We have found that certain health care needs can be provided without the need for a physical exam.  This service lets us provide the care you need with a phone conversation.  If a prescription is necessary we can send it directly to your pharmacy.  If lab work is needed we can place an order for that and you can then stop by our lab to have the test done at a later time. If during the course of the call the physician/provider feels a telephone visit is not appropriate, you will not be charged for this service.\" Verbal consent has been obtained for this service by care team member:         HEART CARE PHONE ENCOUNTER        The patient has chosen to have the visit conducted as a telephone visit, to reduce risk of exposure given the current status of Coronavirus in our community. This telephone visit is being conducted via a call between the patient and physician/provider. Health care needs are being provided without a physical exam.     Assessment/Recommendations   Assessment:    1. Nonischemic cardiomyopathy, heart failure with reduced ejection fraction, ejection fraction 25 %, NYHA class II: His shortness of breath has improved significantly since starting Entresto.  Lower extremity edema has also improved.  Creatinine is slightly elevated at 1.38.  He stays hydrated throughout the day. OptiVol remains elevated but I question accuracy since symptoms have improved dramatically.     2.  Persistent atrial fibrillation: He continues to take Xarelto with a full meal.      Plan:  1.   Heart failure " "medications:  - Beta blocker therapy with carvedilol 25 mg twice daily  - Increase Entresto to 49/51 mg twice daily.   - Diuretic therapy with furosemide decreased to 20 mg daily  2.  He will monitor closely for signs of fluid retention and call with any concerns.  3.  Continue daily weight and blood pressure monitoring  4.  BMP in 3 weeks at primary care office    Follow-up with me in 3 weeks after labs.   I have reviewed the note as documented.  This accurately captures the substance of my conversation with the patient.    Total time of call between patient and provider was 13 minutes   Start Time: 8:36  Stop Time: 8:49       History of Present Illness/Subjective    Glenn Caballero is a 80 y.o. male who is being evaluated via a billable telephone visit.  He has a history of nonischemic cardiomyopathy, heart failure with reduced ejection fraction, CRT-D, persistent atrial fibrillation, and dyslipidemia.  Heart failure with reduced ejection fraction dates back to at least 2014 when ejection fraction was 36%.  In 2018, his ejection fraction had improved to 40%.  Glen has had increased shortness of breath and decreased energy since summer 2019.  Work-up at primary care office was negative.  Echocardiogram on March 11, 2020 showed ejection fraction of 25%, mild tricuspid regurgitation, and mild to moderate mitral regurgitation.  Dr. Barboza has prescribed eplerenone and is recommending Entresto.  He was referred to heart failure clinic for optimization of his medications.    Glen started Entresto about 3 weeks ago.  He states that his \" breathing has improved considerably.\"  Previously he would get short of breath even tying his shoes.  He went golfing yesterday and had minimal shortness of breath.  Lower extremity edema has also improved.  He still gets mild edema throughout the day but this resolves after elevating at night.  Blood pressures have been stable around 110s over 70s.  His weight is stable between 196 to " 198 pounds.  His creatinine has increased slightly to 1.38 and potassium is normal.  He denies lightheadedness or chest pain.      ECHO:   Results for orders placed during the hospital encounter of 03/11/20   Echo Complete [ECH10] 03/11/2020    Narrative   The estimated left ventricular ejection fraction is 25%. This represents   a severely decreased ejection fraction. Cavity is mildly increased.    Normal right ventricular size. The systolic function is mildly reduced.    Severe biatrial enlargement    Mild tricuspid valve regurgitation. Mild pulmonary hypertension present.    Mild to moderate mitral regurgitation.    When compared to the previous study dated 11/8/2018, left ventricular   dilatation with decrease in systolic function is demonstrated.            I have reviewed and updated the patient's Past Medical History, Social History, Family History and Medication List.     Physical Examination not performed given phone encounter Review of Systems                                                Medical History  Surgical History Family History Social History   Past Medical History:   Diagnosis Date   ? Arrhythmia     Afib   ? Atrial fibrillation (H)    ? Atrial flutter (H)    ? Cardiomyopathy (H)    ? CHF (congestive heart failure) (H)    ? Disease of thyroid gland     Hypothyroid   ? Hyperlipidemia    ? Hypertension    ? Pacemaker    ? Peptic ulcer disease    ? Upper GI bleed     due to Duodenal ulcer from NSAID and Warfarin    Past Surgical History:   Procedure Laterality Date   ? ABLATION OF DYSRHYTHMIC FOCUS     ? CARDIAC DEFIBRILLATOR PLACEMENT Left 01/2016    [employer]   ? CARDIOVERSION  08/18/2016   ? INSERT / REPLACE / REMOVE PACEMAKER  7/14   ? [place] ABLATE HEART DYSRHYTHM FOCUS      Description: Catheter Ablation Atrial Flutter;  Recorded: 07/11/2012;  Comments: 2005 for typical right isthmus dependent flutter   ? [place] CARDIOVERSION ELECTIVE ARRHYTHMIA EXTERNAL  6/16/15; 7/13/15    Description: Elective  Cardioversion External;  Recorded: 07/11/2012;  Comments: 5/18/12 for afib   ? pvi ablation  1/25/16    Family History   Problem Relation Age of Onset   ? Diabetes Mother    ? Colon cancer Sister    ? Cancer Sister         BLOOD CANCER    Social History     Socioeconomic History   ? Marital status:      Spouse name: Not on file   ? Number of children: Not on file   ? Years of education: Not on file   ? Highest education level: Not on file   Occupational History   ? Not on file   Social Needs   ? Financial resource strain: Not on file   ? Food insecurity     Worry: Not on file     Inability: Not on file   ? Transportation needs     Medical: Not on file     Non-medical: Not on file   Tobacco Use   ? Smoking status: Never Smoker   ? Smokeless tobacco: Never Used   Substance and Sexual Activity   ? Alcohol use: Yes     Alcohol/week: 3.0 standard drinks     Types: 3 Cans of beer per week   ? Drug use: No   ? Sexual activity: Not on file     Comment: SINGLE    Lifestyle   ? Physical activity     Days per week: Not on file     Minutes per session: Not on file   ? Stress: Not on file   Relationships   ? Social connections     Talks on phone: Not on file     Gets together: Not on file     Attends Jew service: Not on file     Active member of club or organization: Not on file     Attends meetings of clubs or organizations: Not on file     Relationship status: Not on file   ? Intimate partner violence     Fear of current or ex partner: Not on file     Emotionally abused: Not on file     Physically abused: Not on file     Forced sexual activity: Not on file   Other Topics Concern   ? Not on file   Social History Narrative   ? Not on file          Medications  Allergies   Current Outpatient Medications   Medication Sig Dispense Refill   ? acetaminophen (TYLENOL) 500 MG tablet Take 1,000 mg by mouth every 6 (six) hours as needed for pain.     ? ascorbic acid (VITAMIN C) 500 MG tablet Take 500 mg by mouth 2 (two)  times a day.     ? carvedilol (COREG) 25 MG tablet TAKE ONE TABLET BY MOUTH TWICE DAILY with meals 180 tablet 2   ? citalopram (CELEXA) 10 MG tablet Take 1 tablet (10 mg total) by mouth daily. 30 tablet 2   ? furosemide (LASIX) 20 MG tablet Take 1 tablet (20 mg total) by mouth daily. 90 tablet 3   ? levothyroxine (SYNTHROID, LEVOTHROID) 125 MCG tablet Take 1 tablet (125 mcg total) by mouth every morning. 90 tablet 3   ? multivitamin Liqd solution Take 5 mL by mouth every morning.      ? XARELTO 20 mg tablet Take 1 tablet (20 mg total) by mouth daily with supper. 90 tablet 1   ? zolpidem (AMBIEN) 10 mg tablet Take 1 tablet (10 mg total) by mouth at bedtime as needed for sleep. 30 tablet 5   ? sacubitriL-valsartan (ENTRESTO) 49-51 mg Tab tablet Take 1 tablet by mouth 2 (two) times a day. 60 tablet 11     No current facility-administered medications for this visit.     Allergies   Allergen Reactions   ? Amiodarone Other (See Comments)     Pulmonary toxicity   ? Amoxicillin Anaphylaxis   ? Inspra [Eplerenone] Hives     Throat swelling and hives   ? Multaq [Dronedarone] Other (See Comments)     Pt had similar response to Multaq as Amiodarone, c/o SOB and difficulty breathing   ? Spironolactone      Hand swelling, back pain         Lab Results    Chemistry/lipid CBC Cardiac Enzymes/BNP/TSH/INR   Lab Results   Component Value Date    CHOL 221 (H) 07/07/2017    HDL 51 07/07/2017    LDLCALC 141 (H) 07/07/2017    TRIG 147 07/07/2017    CREATININE 1.38 (H) 05/27/2020    BUN 24 05/27/2020    K 4.8 05/27/2020     05/27/2020     05/27/2020    CO2 33 (H) 05/27/2020    Lab Results   Component Value Date    WBC 6.5 04/13/2020    HGB 15.0 04/13/2020    HCT 44.2 04/13/2020    MCV 99 04/13/2020     04/13/2020    Lab Results   Component Value Date    CKTOTAL 218 (H) 07/24/2019    TROPONINI 0.02 08/25/2014     (H) 06/26/2019    TSH 4.68 04/13/2020    INR 1.80 (!) 06/11/2014

## 2021-06-29 NOTE — PROGRESS NOTES
"Progress Notes by Cassi Shin CNP at 6/22/2020  9:50 AM     Author: Cassi Shin CNP Service: -- Author Type: Nurse Practitioner    Filed: 6/22/2020 10:03 AM Encounter Date: 6/22/2020 Status: Signed    : Cassi Shin CNP (Nurse Practitioner)           The patient has been notified of following:     \"This telephone visit will be conducted via a call between you and your physician/provider. We have found that certain health care needs can be provided without the need for a physical exam.  This service lets us provide the care you need with a phone conversation.  If a prescription is necessary we can send it directly to your pharmacy.  If lab work is needed we can place an order for that and you can then stop by our lab to have the test done at a later time. If during the course of the call the physician/provider feels a telephone visit is not appropriate, you will not be charged for this service.\" Verbal consent has been obtained for this service by care team member:         HEART CARE PHONE ENCOUNTER        The patient has chosen to have the visit conducted as a telephone visit, to reduce risk of exposure given the current status of Coronavirus in our community. This telephone visit is being conducted via a call between the patient and physician/provider. Health care needs are being provided without a physical exam.     Assessment/Recommendations   Assessment:    1. Nonischemic cardiomyopathy, heart failure with reduced ejection fraction, ejection fraction 25%, NYHA class II: He has noticed improvement since starting Entresto.  He does not have as much shortness of breath with activity and energy is improved.  He did not tolerate a lower dose of Lasix due to edema and weight gain.  He is eager to increase Entresto again to see if he has further improvement of his symptoms.    2.  Persistent atrial fibrillation: He continues to take Xarelto with a full meal.      Plan:  1.   " Heart failure medications:  - Beta blocker therapy with carvedilol 25 mg twice daily  - Entresto to 49/51 mg twice daily.   - Diuretic therapy with furosemide 40 mg daily  2.  He will have a BMP this week.  If stable, plan to increase Entresto  3.  Continue daily weight and blood pressure monitoring  4.  He plans to move to [place], [place] in July.  I encouraged him to start looking for a primary care provider and cardiologist to establish care in the first 1 to 2 months after moving.    Follow-up phone call after labs this week  I have reviewed the note as documented.  This accurately captures the substance of my conversation with the patient.    Total time of call between patient and provider was 8 minutes   Start Time: 9:46  Stop Time: 9:54       History of Present Illness/Subjective    Glenn Caballero is a 80 y.o. male who is being evaluated via a billable telephone visit.  He has a history of nonischemic cardiomyopathy, heart failure with reduced ejection fraction, CRT-D, persistent atrial fibrillation, and dyslipidemia.  Heart failure with reduced ejection fraction dates back to at least 2014 when ejection fraction was 36%.  In 2018, his ejection fraction had improved to 40%.  Glen has had increased shortness of breath and decreased energy since summer 2019.  Work-up at primary care office was negative.  Echocardiogram on March 11, 2020 showed ejection fraction of 25%, mild tricuspid regurgitation, and mild to moderate mitral regurgitation.  Dr. Barboza has prescribed eplerenone and is recommending Entresto.  He was referred to heart failure clinic for optimization of his medications.    Glen's dose of Entresto was increased on May 29, 2020 to Entresto 49/51 mg twice daily and his Lasix was decreased to 20 mg daily.  However, he developed fluid retention with edema and a 2 to 3 pound weight gain.  Lasix was increased back to 40 mg daily.  He states that swelling resolved and his weight has now decreased 3 to  5 pounds.  Home weight is now stable around 193 pounds.  He states that he feels better on this increased dose of Entresto.  His shortness of breath is better and he has more energy.  He still has mild shortness of breath with walking upstairs but recovers more quickly.  He denies any lightheadedness or chest pain.  Home blood pressures are stable between 1 parents to 120 systolically.      ECHO:   Results for orders placed during the hospital encounter of 03/11/20   Echo Complete [ECH10] 03/11/2020    Narrative   The estimated left ventricular ejection fraction is 25%. This represents   a severely decreased ejection fraction. Cavity is mildly increased.    Normal right ventricular size. The systolic function is mildly reduced.    Severe biatrial enlargement    Mild tricuspid valve regurgitation. Mild pulmonary hypertension present.    Mild to moderate mitral regurgitation.    When compared to the previous study dated 11/8/2018, left ventricular   dilatation with decrease in systolic function is demonstrated.            I have reviewed and updated the patient's Past Medical History, Social History, Family History and Medication List.     Physical Examination not performed given phone encounter Review of Systems                                                Medical History  Surgical History Family History Social History   Past Medical History:   Diagnosis Date   ? Arrhythmia     Afib   ? Atrial fibrillation (H)    ? Atrial flutter (H)    ? Cardiomyopathy (H)    ? CHF (congestive heart failure) (H)    ? Disease of thyroid gland     Hypothyroid   ? Hyperlipidemia    ? Hypertension    ? Pacemaker    ? Peptic ulcer disease    ? Upper GI bleed     due to Duodenal ulcer from NSAID and Warfarin    Past Surgical History:   Procedure Laterality Date   ? ABLATION OF DYSRHYTHMIC FOCUS     ? CARDIAC DEFIBRILLATOR PLACEMENT Left 01/2016    [employer]   ? CARDIOVERSION  08/18/2016   ? INSERT / REPLACE / REMOVE PACEMAKER  7/14   ?  [place] ABLATE HEART DYSRHYTHM FOCUS      Description: Catheter Ablation Atrial Flutter;  Recorded: 07/11/2012;  Comments: 2005 for typical right isthmus dependent flutter   ? [place] CARDIOVERSION ELECTIVE ARRHYTHMIA EXTERNAL  6/16/15; 7/13/15    Description: Elective Cardioversion External;  Recorded: 07/11/2012;  Comments: 5/18/12 for afib   ? pvi ablation  1/25/16    Family History   Problem Relation Age of Onset   ? Diabetes Mother    ? Colon cancer Sister    ? Cancer Sister         BLOOD CANCER    Social History     Socioeconomic History   ? Marital status:      Spouse name: Not on file   ? Number of children: Not on file   ? Years of education: Not on file   ? Highest education level: Not on file   Occupational History   ? Not on file   Social Needs   ? Financial resource strain: Not on file   ? Food insecurity     Worry: Not on file     Inability: Not on file   ? Transportation needs     Medical: Not on file     Non-medical: Not on file   Tobacco Use   ? Smoking status: Never Smoker   ? Smokeless tobacco: Never Used   Substance and Sexual Activity   ? Alcohol use: Yes     Alcohol/week: 3.0 standard drinks     Types: 3 Cans of beer per week   ? Drug use: No   ? Sexual activity: Not on file     Comment: SINGLE    Lifestyle   ? Physical activity     Days per week: Not on file     Minutes per session: Not on file   ? Stress: Not on file   Relationships   ? Social connections     Talks on phone: Not on file     Gets together: Not on file     Attends Oriental orthodox service: Not on file     Active member of club or organization: Not on file     Attends meetings of clubs or organizations: Not on file     Relationship status: Not on file   ? Intimate partner violence     Fear of current or ex partner: Not on file     Emotionally abused: Not on file     Physically abused: Not on file     Forced sexual activity: Not on file   Other Topics Concern   ? Not on file   Social History Narrative   ? Not on file          Medications   Allergies   Current Outpatient Medications   Medication Sig Dispense Refill   ? acetaminophen (TYLENOL) 500 MG tablet Take 1,000 mg by mouth every 6 (six) hours as needed for pain.     ? ascorbic acid (VITAMIN C) 500 MG tablet Take 500 mg by mouth 2 (two) times a day.     ? carvedilol (COREG) 25 MG tablet TAKE ONE TABLET BY MOUTH TWICE DAILY with meals 180 tablet 2   ? citalopram (CELEXA) 10 MG tablet Take 1 tablet (10 mg total) by mouth daily. 30 tablet 2   ? furosemide (LASIX) 20 MG tablet Take 2 tablets (40 mg total) by mouth daily. 90 tablet 3   ? levothyroxine (SYNTHROID, LEVOTHROID) 125 MCG tablet Take 1 tablet (125 mcg total) by mouth every morning. 90 tablet 3   ? multivitamin Liqd solution Take 5 mL by mouth every morning.      ? sacubitriL-valsartan (ENTRESTO) 49-51 mg Tab tablet Take 1 tablet by mouth 2 (two) times a day. 60 tablet 11   ? XARELTO 20 mg tablet Take 1 tablet (20 mg total) by mouth daily with supper. 90 tablet 1   ? zolpidem (AMBIEN) 10 mg tablet Take 1 tablet (10 mg total) by mouth at bedtime as needed for sleep. 30 tablet 5     No current facility-administered medications for this visit.     Allergies   Allergen Reactions   ? Amiodarone Other (See Comments)     Pulmonary toxicity   ? Amoxicillin Anaphylaxis   ? Inspra [Eplerenone] Hives     Throat swelling and hives   ? Multaq [Dronedarone] Other (See Comments)     Pt had similar response to Multaq as Amiodarone, c/o SOB and difficulty breathing   ? Spironolactone      Hand swelling, back pain         Lab Results    Chemistry/lipid CBC Cardiac Enzymes/BNP/TSH/INR   Lab Results   Component Value Date    CHOL 221 (H) 07/07/2017    HDL 51 07/07/2017    LDLCALC 141 (H) 07/07/2017    TRIG 147 07/07/2017    CREATININE 1.38 (H) 05/27/2020    BUN 24 05/27/2020    K 4.8 05/27/2020     05/27/2020     05/27/2020    CO2 33 (H) 05/27/2020    Lab Results   Component Value Date    WBC 6.5 04/13/2020    HGB 15.0 04/13/2020    HCT 44.2  04/13/2020    MCV 99 04/13/2020     04/13/2020    Lab Results   Component Value Date    CKTOTAL 218 (H) 07/24/2019    TROPONINI 0.02 08/25/2014     (H) 06/26/2019    TSH 4.68 04/13/2020    INR 1.80 (!) 06/11/2014

## 2021-06-29 NOTE — PROGRESS NOTES
Progress Notes by Qiana Main RN at 5/28/2020  8:40 AM     Author: Qiana Main RN Service: -- Author Type: Registered Nurse    Filed: 6/8/2020 10:00 PM Encounter Date: 5/28/2020 Status: Signed    : Qiana Main RN (Registered Nurse)       Type: Remote CRT-D transmission prior to upcoming phone visit 5/29/20, recheck OptiVol  Presenting Rhythm: A.fib with ventricular sensing and BiV pacing; HRs 60-80s  Lead/Battery status: stable   Arrhythmias: Chronic A.fib (programmed VVIR); 1 NSVT detected since 5/8/2020  Anticoagulant: Xarelto  Comments: Normal Device Function. BiV pacing 72.4% (Consistent with past).  Abnormal OptiVol data noted, report routed to Cassi ROSALES CNP. ML                Qiana Main RN

## 2021-06-29 NOTE — PROGRESS NOTES
"Progress Notes by Orlin Barboza MD at 5/11/2020  8:30 AM     Author: Orlin Barboza MD Service: -- Author Type: Physician    Filed: 5/11/2020  9:01 AM Encounter Date: 5/11/2020 Status: Signed    : Orlin Barboza MD (Physician)           The patient has been notified of following:     \"This telephone visit will be conducted via a call between you and your physician/provider. We have found that certain health care needs can be provided without the need for a physical exam.  This service lets us provide the care you need with a phone conversation.  If a prescription is necessary we can send it directly to your pharmacy.  If lab work is needed we can place an order for that and you can then stop by our lab to have the test done at a later time. If during the course of the call the physician/provider feels a telephone visit is not appropriate, you will not be charged for this service.\" Verbal consent has been obtained for this service by care team member:         HEART CARE PHONE ENCOUNTER        The patient has chosen to have the visit conducted as a telephone visit, to reduce risk of exposure given the current status of Coronavirus in our community. This telephone visit is being conducted via a call between the patient and physician/provider. Health care needs are being provided without a physical exam.     Assessment/Recommendations   Assessment:    1. Assessment:  Dilated congestive cardiomyopathy ejection fractions have been as low as 20%; more recently ejection fraction 35-37% by echocardiogram or nuclear scan         EF 39% Echocardiogram 5-2015        34 % by Echo        30-35% by echocardiogram September 15 2016       EF 25% Echo 3- (mild-mod MR)  Reaction spironolactone; inspira   Paroxysmal atrial fibrillation; prior CV=3       Cardioversion August 18 2016       Cardioversion 8-       Prior atrial flutter ablation         AF ablation 1--lots of atrial " scar  Atrial fibrillation with rate control strategy May 2017  Sinus node dysfunction with AV block    Dual-chamber Medtronic pacemaker 7-  upgrade to CRTD 2- -Medtronic  No known coronary artery disease although nuclear scan did show a small fixed inferior lateral defect without reversibility-8/25/2014 ;   Normal coronary angiography 2005    Modest renal insufficiency with recent creatinine 1.4    Frequent PVCs about 42 per hour    Amiodarone toxicity with pulmonary insufficiency-severe      Chronic anticoagulation with Xarelto   Prior GI bleed felt related to nonsteroidal anti-inflammatories    Plan:  1.  You had allergic reaction to tlEpelernone/Inspra whichhas been discontinued  Previously you had problems with Spironolactone  Now are on Entresto and seem to have improved  Echocardiogram March 11, 2020 showed a declining ejection fraction of 25%  Device interrogation shows the battery should last another 1 year 11 months  No abnormal ventricular arrhythmias were identified  There was evidence for fluid retention on the Optvol l detection algorithm  You tell me your weight is up several pounds but is declining now and currently take furosemide 40 mg daily  Blood work April 13, 2020 was good, creatinine 1.37 is nearly normal  Your blood pressure has been satisfactory watch to follow this closely as he started on Entresto  Continue to have device check every 3 months through transtelephonic monitoring  Return in 6 months for comprehensive cardiac arrhythmia device assessment      Follow Up Plan: Follow up in   I have reviewed the note as documented.  This accurately captures the substance of my conversation with the patient.    Total time of call between patient and provider was 23 minutes   Start Time:820  Stop Time:843  Additional 20 minutes to review medical records and generate report        History of Present Illness/Subjective    Glenn Caballero is a 79 y.o. male who is being evaluated via a  billable telephone visit.    Lab studies 4-  Creatinine=1.37  Normal lytes  TSH=4.68  He was started on Inspra but developed a allergic reaction and this medication was stopped  Now started on Entresto and seems to have had a remarkable improvement even after just a couple doses  Echocardiogram March 11, 2020 showed ejection fraction 25% which is worse than his previous studies although he has previously had ejection fractions in the 30-35% range.  There is mild to moderate mitral regurgitation  He noted to increase fluid retention in about a 7 pound weight gain.  Furosemide was increased from 40 mg daily to 80 mg daily and he seemed to gain weight on the increase in furosemide  Previously was on spironolactone but developed a lot of achiness on the medication so this was stopped  Had blood work April 13, 2020 that showed normal electrolyte pattern  Creatinine 1.37  Was started on Entresto and almost immediately felt better and feels considerably improved with more energy and less breathlessness  No chest pain  No palpitations  No syncopal or near syncopal episodes  ICD interrogation showed that his biventricular system has a battery of 1 year and 11 months  72% biventricular pacing uncertain if this represents some PVCs or intrinsic A. fib conduction  Abnormal OptiVol showing fluid retention  Home blood pressures range between 103-128 systolic-but no low blood pressures    I have reviewed and updated the patient's Past Medical History, Social History, Family History and Medication List.     Physical Examination not performed given phone encounter Review of Systems                                                Medical History  Surgical History Family History Social History   Past Medical History:   Diagnosis Date   ? Arrhythmia     Afib   ? Atrial fibrillation (H)    ? Atrial flutter (H)    ? Cardiomyopathy (H)    ? CHF (congestive heart failure) (H)    ? Disease of thyroid gland     Hypothyroid   ?  Hyperlipidemia    ? Hypertension    ? Pacemaker    ? Peptic ulcer disease    ? Upper GI bleed     due to Duodenal ulcer from NSAID and Warfarin    Past Surgical History:   Procedure Laterality Date   ? ABLATION OF DYSRHYTHMIC FOCUS     ? CARDIAC DEFIBRILLATOR PLACEMENT Left 01/2016    Medtronic   ? CARDIOVERSION  08/18/2016   ? INSERT / REPLACE / REMOVE PACEMAKER  7/14   ? [place] ABLATE HEART DYSRHYTHM FOCUS      Description: Catheter Ablation Atrial Flutter;  Recorded: 07/11/2012;  Comments: 2005 for typical right isthmus dependent flutter   ? [place] CARDIOVERSION ELECTIVE ARRHYTHMIA EXTERNAL  6/16/15; 7/13/15    Description: Elective Cardioversion External;  Recorded: 07/11/2012;  Comments: 5/18/12 for afib   ? pvi ablation  1/25/16    Family History   Problem Relation Age of Onset   ? Diabetes Mother    ? Colon cancer Sister    ? Cancer Sister         BLOOD CANCER    Social History     Socioeconomic History   ? Marital status:      Spouse name: Not on file   ? Number of children: Not on file   ? Years of education: Not on file   ? Highest education level: Not on file   Occupational History   ? Not on file   Social Needs   ? Financial resource strain: Not on file   ? Food insecurity     Worry: Not on file     Inability: Not on file   ? Transportation needs     Medical: Not on file     Non-medical: Not on file   Tobacco Use   ? Smoking status: Never Smoker   ? Smokeless tobacco: Never Used   Substance and Sexual Activity   ? Alcohol use: Yes     Alcohol/week: 3.0 standard drinks     Types: 3 Cans of beer per week   ? Drug use: No   ? Sexual activity: Not on file     Comment: SINGLE    Lifestyle   ? Physical activity     Days per week: Not on file     Minutes per session: Not on file   ? Stress: Not on file   Relationships   ? Social connections     Talks on phone: Not on file     Gets together: Not on file     Attends Judaism service: Not on file     Active member of club or organization: Not on file     Attends  meetings of clubs or organizations: Not on file     Relationship status: Not on file   ? Intimate partner violence     Fear of current or ex partner: Not on file     Emotionally abused: Not on file     Physically abused: Not on file     Forced sexual activity: Not on file   Other Topics Concern   ? Not on file   Social History Narrative   ? Not on file          Medications  Allergies   Current Outpatient Medications   Medication Sig Dispense Refill   ? acetaminophen (TYLENOL) 500 MG tablet Take 1,000 mg by mouth every 6 (six) hours as needed for pain.     ? ascorbic acid (VITAMIN C) 500 MG tablet Take 500 mg by mouth 2 (two) times a day.     ? carvedilol (COREG) 25 MG tablet TAKE ONE TABLET BY MOUTH TWICE DAILY with meals 180 tablet 2   ? citalopram (CELEXA) 10 MG tablet Take 1 tablet (10 mg total) by mouth daily. 30 tablet 2   ? furosemide (LASIX) 40 MG tablet Take 1 tablet (40 mg total) by mouth daily. 90 tablet 1   ? levothyroxine (SYNTHROID, LEVOTHROID) 125 MCG tablet Take 1 tablet (125 mcg total) by mouth every morning. 90 tablet 3   ? multivitamin Liqd solution Take 5 mL by mouth every morning.      ? sacubitriL-valsartan (ENTRESTO) 24-26 mg Tab tablet Take 1 tablet by mouth 2 (two) times a day. 60 tablet 11   ? XARELTO 20 mg tablet Take 1 tablet (20 mg total) by mouth daily with supper. 90 tablet 1   ? zolpidem (AMBIEN) 10 mg tablet Take 1 tablet (10 mg total) by mouth at bedtime as needed for sleep. 30 tablet 5     No current facility-administered medications for this visit.     Allergies   Allergen Reactions   ? Amiodarone Other (See Comments)     Pulmonary toxicity   ? Amoxicillin Anaphylaxis   ? Inspra [Eplerenone] Hives     Throat swelling and hives   ? Multaq [Dronedarone] Other (See Comments)     Pt had similar response to Multaq as Amiodarone, c/o SOB and difficulty breathing   ? Spironolactone      Hand swelling, back pain         Lab Results    Chemistry/lipid CBC Cardiac Enzymes/BNP/TSH/INR   Lab  Results   Component Value Date    CHOL 221 (H) 07/07/2017    HDL 51 07/07/2017    LDLCALC 141 (H) 07/07/2017    TRIG 147 07/07/2017    CREATININE 1.37 (H) 04/13/2020    BUN 20 04/13/2020    K 4.5 04/13/2020     04/13/2020    CL 98 04/13/2020    CO2 31 04/13/2020    Lab Results   Component Value Date    WBC 6.5 04/13/2020    HGB 15.0 04/13/2020    HCT 44.2 04/13/2020    MCV 99 04/13/2020     04/13/2020    Lab Results   Component Value Date    CKTOTAL 218 (H) 07/24/2019    TROPONINI 0.02 08/25/2014     (H) 06/26/2019    TSH 4.68 04/13/2020    INR 1.80 (!) 06/11/2014        Orlin Barboza

## 2021-06-29 NOTE — PROGRESS NOTES
Progress Notes by Qiana Main, RN at 5/8/2020 10:38 AM     Author: Qiana Main RN Service: -- Author Type: Registered Nurse    Filed: 5/8/2020 11:32 AM Encounter Date: 5/8/2020 Status: Signed    : Qiana Main RN (Registered Nurse)         Type: routine remote CRT-D transmission prior to appointment with Dr. Barboza.  Presenting rhythm: AF with biV pacing and sensing, rate 63 bpm.  Battery/lead status: stable  Arrhythmias: since 2/27/20, three NSVT detected.  Anticoagulant: Xarelto  Comments: normal ICD function. BiV pacing 71.3% (consistent with past). Routed to device RN for review of Optivol/TI.  Device/lead alerts: none. prd      Transmission reviewed, agree with above. Known Lower BiV pacing, due to intrinsic rate interference (but narrow intrinsic QRS per Dr. Barboza). OptiVol showing abnormal changes since Mid-April. Recent virtual visit with Cassi ROSALES CNP and upcoming telephone visit with Dr. Barboza on 5/11/20.     Will forward OptiVol results to Cassi ROSALES CNP.   Qiana Main, RN

## 2021-06-29 NOTE — PROGRESS NOTES
"Progress Notes by Cassi Shin CNP at 4/22/2020  8:30 AM     Author: Cassi Shin CNP Service: -- Author Type: Nurse Practitioner    Filed: 4/22/2020  8:53 AM Encounter Date: 4/22/2020 Status: Signed    : Cassi Shin CNP (Nurse Practitioner)           The patient has been notified of following:     \"This telephone visit will be conducted via a call between you and your physician/provider. We have found that certain health care needs can be provided without the need for a physical exam.  This service lets us provide the care you need with a phone conversation.  If a prescription is necessary we can send it directly to your pharmacy.  If lab work is needed we can place an order for that and you can then stop by our lab to have the test done at a later time. If during the course of the call the physician/provider feels a telephone visit is not appropriate, you will not be charged for this service.\" Verbal consent has been obtained for this service by care team member:         HEART CARE PHONE ENCOUNTER        The patient has chosen to have the visit conducted as a telephone visit, to reduce risk of exposure given the current status of Coronavirus in our community. This telephone visit is being conducted via a call between the patient and physician/provider. Health care needs are being provided without a physical exam.     Assessment/Recommendations   Assessment:    1. Nonischemic cardiomyopathy, heart failure with reduced ejection fraction, ejection fraction 25 %, NYHA class III: He developed a rash and swelling of his throat with eplerenone.  He had no trouble breathing or swallowing.  Rash and swelling are both improving since being off eplerenone.  He is now retaining fluid with weight gain and edema.  We again discussed changing irbesartan to Entresto but would like to wait until allergic reaction to eplerenone completely resolves before starting.    2.  Persistent atrial " fibrillation: He continues to take Xarelto with a full meal.      Plan:  1.   Heart failure medications:  - Beta blocker therapy with carvedilol 25 mg twice daily  - ARB therapy with irbesartan 150 mg daily  - Diuretic therapy with furosemide 40 mg daily.  Increase to 80 mg daily for 3 days due to fluid retention  2.  Once allergic reaction completely resolves from eplerenone, plan to stop irbesartan and start Entresto  3.  Continue daily weights and low-sodium diet    Follow-up with Dr. Barboza on May 11.  I have reviewed the note as documented.  This accurately captures the substance of my conversation with the patient.    Total time of call between patient and provider was 8 minutes   Start Time: 8:31  Stop Time: 8:39       History of Present Illness/Subjective    Glenn Caballero is a 79 y.o. male who is being evaluated via a billable telephone visit.  He has a history of nonischemic cardiomyopathy, heart failure with reduced ejection fraction, CRT-D, persistent atrial fibrillation, and dyslipidemia.  Heart failure with reduced ejection fraction dates back to at least 2014 when ejection fraction was 36%.  In 2018, his ejection fraction had improved to 40%.  Glen has had increased shortness of breath and decreased energy since summer 2019.  Work-up at primary care office was negative.  Echocardiogram on March 11, 2020 showed ejection fraction of 25%, mild tricuspid regurgitation, and mild to moderate mitral regurgitation.  Dr. Barboza has prescribed eplerenone and is recommending Entresto.  He was referred to heart failure clinic for optimization of his medications.    Glen did not tolerate eplerenone.  He developed a rash which is slowly improving since stopping eplerenone.  He describes a feeling of swelling in his throat with eplerenone.  This is also improving.  He denies any trouble breathing or swallowing.  He notes symptoms of fluid retention over the past week with a 7 pound weight gain and edema.  He has  chronic shortness of breath with activity but denies any worsening.  He denies orthopnea.  He denies chest pain or lightheadedness.    Home weight today was 196 pounds.      ECHO:   Results for orders placed during the hospital encounter of 03/11/20   Echo Complete [ECH10] 03/11/2020    Narrative   The estimated left ventricular ejection fraction is 25%. This represents   a severely decreased ejection fraction. Cavity is mildly increased.    Normal right ventricular size. The systolic function is mildly reduced.    Severe biatrial enlargement    Mild tricuspid valve regurgitation. Mild pulmonary hypertension present.    Mild to moderate mitral regurgitation.    When compared to the previous study dated 11/8/2018, left ventricular   dilatation with decrease in systolic function is demonstrated.            I have reviewed and updated the patient's Past Medical History, Social History, Family History and Medication List.     Physical Examination not performed given phone encounter Review of Systems                                                Medical History  Surgical History Family History Social History   Past Medical History:   Diagnosis Date   ? Arrhythmia     Afib   ? Atrial fibrillation (H)    ? Atrial flutter (H)    ? Cardiomyopathy (H)    ? CHF (congestive heart failure) (H)    ? Disease of thyroid gland     Hypothyroid   ? Hyperlipidemia    ? Hypertension    ? Pacemaker    ? Peptic ulcer disease    ? Upper GI bleed     due to Duodenal ulcer from NSAID and Warfarin    Past Surgical History:   Procedure Laterality Date   ? ABLATION OF DYSRHYTHMIC FOCUS     ? CARDIAC DEFIBRILLATOR PLACEMENT Left 01/2016    Medtronic   ? CARDIOVERSION  08/18/2016   ? INSERT / REPLACE / REMOVE PACEMAKER  7/14   ? [place] ABLATE HEART DYSRHYTHM FOCUS      Description: Catheter Ablation Atrial Flutter;  Recorded: 07/11/2012;  Comments: 2005 for typical right isthmus dependent flutter   ? [place] CARDIOVERSION ELECTIVE ARRHYTHMIA EXTERNAL   6/16/15; 7/13/15    Description: Elective Cardioversion External;  Recorded: 07/11/2012;  Comments: 5/18/12 for afib   ? pvi ablation  1/25/16    Family History   Problem Relation Age of Onset   ? Diabetes Mother    ? Colon cancer Sister    ? Cancer Sister         BLOOD CANCER    Social History     Socioeconomic History   ? Marital status:      Spouse name: Not on file   ? Number of children: Not on file   ? Years of education: Not on file   ? Highest education level: Not on file   Occupational History   ? Not on file   Social Needs   ? Financial resource strain: Not on file   ? Food insecurity     Worry: Not on file     Inability: Not on file   ? Transportation needs     Medical: Not on file     Non-medical: Not on file   Tobacco Use   ? Smoking status: Never Smoker   ? Smokeless tobacco: Never Used   Substance and Sexual Activity   ? Alcohol use: Yes     Alcohol/week: 3.0 standard drinks     Types: 3 Cans of beer per week   ? Drug use: No   ? Sexual activity: Not on file     Comment: SINGLE    Lifestyle   ? Physical activity     Days per week: Not on file     Minutes per session: Not on file   ? Stress: Not on file   Relationships   ? Social connections     Talks on phone: Not on file     Gets together: Not on file     Attends Denominational service: Not on file     Active member of club or organization: Not on file     Attends meetings of clubs or organizations: Not on file     Relationship status: Not on file   ? Intimate partner violence     Fear of current or ex partner: Not on file     Emotionally abused: Not on file     Physically abused: Not on file     Forced sexual activity: Not on file   Other Topics Concern   ? Not on file   Social History Narrative   ? Not on file          Medications  Allergies   Current Outpatient Medications   Medication Sig Dispense Refill   ? acetaminophen (TYLENOL) 500 MG tablet Take 1,000 mg by mouth every 6 (six) hours as needed for pain.     ? ascorbic acid (VITAMIN C) 500  MG tablet Take 500 mg by mouth 2 (two) times a day.     ? carvedilol (COREG) 25 MG tablet TAKE ONE TABLET BY MOUTH TWICE DAILY with meals 180 tablet 2   ? citalopram (CELEXA) 10 MG tablet Take 1 tablet (10 mg total) by mouth daily. 30 tablet 2   ? furosemide (LASIX) 40 MG tablet Take 1 tablet (40 mg total) by mouth daily. 90 tablet 1   ? irbesartan (AVAPRO) 150 MG tablet Take 1 tablet (150 mg total) by mouth daily. 90 tablet 2   ? levothyroxine (SYNTHROID, LEVOTHROID) 125 MCG tablet Take 1 tablet (125 mcg total) by mouth every morning. 90 tablet 3   ? multivitamin Liqd solution Take 5 mL by mouth every morning.      ? XARELTO 20 mg tablet Take 1 tablet (20 mg total) by mouth daily with supper. 90 tablet 1   ? zolpidem (AMBIEN) 10 mg tablet Take 1 tablet (10 mg total) by mouth at bedtime as needed for sleep. 30 tablet 5     No current facility-administered medications for this visit.     Allergies   Allergen Reactions   ? Amiodarone Other (See Comments)     Pulmonary toxicity   ? Amoxicillin Anaphylaxis   ? Multaq [Dronedarone] Other (See Comments)     Pt had similar response to Multaq as Amiodarone, c/o SOB and difficulty breathing   ? Inspra [Eplerenone] Hives   ? Spironolactone      Hand swelling, back pain         Lab Results    Chemistry/lipid CBC Cardiac Enzymes/BNP/TSH/INR   Lab Results   Component Value Date    CHOL 221 (H) 07/07/2017    HDL 51 07/07/2017    LDLCALC 141 (H) 07/07/2017    TRIG 147 07/07/2017    CREATININE 1.37 (H) 04/13/2020    BUN 20 04/13/2020    K 4.5 04/13/2020     04/13/2020    CL 98 04/13/2020    CO2 31 04/13/2020    Lab Results   Component Value Date    WBC 6.5 04/13/2020    HGB 15.0 04/13/2020    HCT 44.2 04/13/2020    MCV 99 04/13/2020     04/13/2020    Lab Results   Component Value Date    CKTOTAL 218 (H) 07/24/2019    TROPONINI 0.02 08/25/2014     (H) 06/26/2019    TSH 4.68 04/13/2020    INR 1.80 (!) 06/11/2014

## 2021-06-29 NOTE — PROGRESS NOTES
"Progress Notes by Cassi Shin CNP at 7/17/2020  2:10 PM     Author: Cassi Shin CNP Service: -- Author Type: Nurse Practitioner    Filed: 7/17/2020  2:24 PM Encounter Date: 7/17/2020 Status: Signed    : Cassi Shin CNP (Nurse Practitioner)           The patient has been notified of following:     \"This telephone visit will be conducted via a call between you and your physician/provider. We have found that certain health care needs can be provided without the need for a physical exam.  This service lets us provide the care you need with a phone conversation.  If a prescription is necessary we can send it directly to your pharmacy.  If lab work is needed we can place an order for that and you can then stop by our lab to have the test done at a later time. If during the course of the call the physician/provider feels a telephone visit is not appropriate, you will not be charged for this service.\" Verbal consent has been obtained for this service by care team member:         HEART CARE PHONE ENCOUNTER        The patient has chosen to have the visit conducted as a telephone visit, to reduce risk of exposure given the current status of Coronavirus in our community. This telephone visit is being conducted via a call between the patient and physician/provider. Health care needs are being provided without a physical exam.     Assessment/Recommendations   Assessment:    1. Nonischemic cardiomyopathy, heart failure with reduced ejection fraction, ejection fraction 25%, NYHA class II: He has noticed improvement since starting Entresto.  He is tolerating high dose Entresto. He only notes dyspnea on exertion with running up the stairs and no longer has edema.     2.  Persistent atrial fibrillation: He continues to take Xarelto with a full meal.      Plan:  1.   Heart failure medications:  - Beta blocker therapy with carvedilol 25 mg twice daily  - Entresto to 97/103 mg twice daily.   - " Diuretic therapy with furosemide 40 mg daily  2.  BMP on 7/15/2020 normal  3.  Continue daily weight and blood pressure monitoring  4.  He plans to move to [place], [place] in August.  He will set up care with a cardiologist and primary care provider in [place]. He will call our clinic in the meantime if needed.     I have reviewed the note as documented.  This accurately captures the substance of my conversation with the patient.    Total time of call between patient and provider was 6 minutes   Start Time: 2:12  Stop Time: 2:18       History of Present Illness/Subjective    Glenn Caballero is a 80 y.o. male who is being evaluated via a billable telephone visit.  He has a history of nonischemic cardiomyopathy, heart failure with reduced ejection fraction, CRT-D, persistent atrial fibrillation, and dyslipidemia.  Heart failure with reduced ejection fraction dates back to at least 2014 when ejection fraction was 36%.  In 2018, his ejection fraction had improved to 40%.  Glen has had increased shortness of breath and decreased energy since summer 2019.  Work-up at primary care office was negative.  Echocardiogram on March 11, 2020 showed ejection fraction of 25%, mild tricuspid regurgitation, and mild to moderate mitral regurgitation.  Dr. Barboza has prescribed eplerenone and is recommending Entresto.  He was referred to heart failure clinic for optimization of his medications.    Glen's dose of Entresto was increased to 97/103 mg twice daily at his last appointment.  He is tolerating this dose with no side effects. Overall, he states that he feels much better on Entresto.  He only notes dyspnea on exertion if running up the stairs.   He no longer has edema. He denies chest pain or lightheadedness.  Blood pressure has been stable between 110-124 systolically. His weight has decreased about 1-2 pounds in the last few weeks.         ECHO:   Results for orders placed during the hospital encounter of 03/11/20   Echo  Complete [ECH10] 03/11/2020    Narrative   The estimated left ventricular ejection fraction is 25%. This represents   a severely decreased ejection fraction. Cavity is mildly increased.    Normal right ventricular size. The systolic function is mildly reduced.    Severe biatrial enlargement    Mild tricuspid valve regurgitation. Mild pulmonary hypertension present.    Mild to moderate mitral regurgitation.    When compared to the previous study dated 11/8/2018, left ventricular   dilatation with decrease in systolic function is demonstrated.            I have reviewed and updated the patient's Past Medical History, Social History, Family History and Medication List.     Physical Examination not performed given phone encounter Review of Systems                                                Medical History  Surgical History Family History Social History   Past Medical History:   Diagnosis Date   ? Arrhythmia     Afib   ? Atrial fibrillation (H)    ? Atrial flutter (H)    ? Cardiomyopathy (H)    ? CHF (congestive heart failure) (H)    ? Disease of thyroid gland     Hypothyroid   ? Hyperlipidemia    ? Hypertension    ? Pacemaker    ? Peptic ulcer disease    ? Upper GI bleed     due to Duodenal ulcer from NSAID and Warfarin    Past Surgical History:   Procedure Laterality Date   ? ABLATION OF DYSRHYTHMIC FOCUS     ? CARDIAC DEFIBRILLATOR PLACEMENT Left 01/2016    [employer]   ? CARDIOVERSION  08/18/2016   ? INSERT / REPLACE / REMOVE PACEMAKER  7/14   ? FL ABLATE HEART DYSRHYTHM FOCUS      Description: Catheter Ablation Atrial Flutter;  Recorded: 07/11/2012;  Comments: 2005 for typical right isthmus dependent flutter   ? FL CARDIOVERSION ELECTIVE ARRHYTHMIA EXTERNAL  6/16/15; 7/13/15    Description: Elective Cardioversion External;  Recorded: 07/11/2012;  Comments: 5/18/12 for afib   ? pvi ablation  1/25/16    Family History   Problem Relation Age of Onset   ? Diabetes Mother    ? Colon cancer Sister    ? Cancer Sister          BLOOD CANCER    Social History     Socioeconomic History   ? Marital status:      Spouse name: Not on file   ? Number of children: Not on file   ? Years of education: Not on file   ? Highest education level: Not on file   Occupational History   ? Not on file   Social Needs   ? Financial resource strain: Not on file   ? Food insecurity     Worry: Not on file     Inability: Not on file   ? Transportation needs     Medical: Not on file     Non-medical: Not on file   Tobacco Use   ? Smoking status: Never Smoker   ? Smokeless tobacco: Never Used   Substance and Sexual Activity   ? Alcohol use: Yes     Alcohol/week: 3.0 standard drinks     Types: 3 Cans of beer per week   ? Drug use: No   ? Sexual activity: Not on file     Comment: SINGLE    Lifestyle   ? Physical activity     Days per week: Not on file     Minutes per session: Not on file   ? Stress: Not on file   Relationships   ? Social connections     Talks on phone: Not on file     Gets together: Not on file     Attends Yazidism service: Not on file     Active member of club or organization: Not on file     Attends meetings of clubs or organizations: Not on file     Relationship status: Not on file   ? Intimate partner violence     Fear of current or ex partner: Not on file     Emotionally abused: Not on file     Physically abused: Not on file     Forced sexual activity: Not on file   Other Topics Concern   ? Not on file   Social History Narrative   ? Not on file          Medications  Allergies   Current Outpatient Medications   Medication Sig Dispense Refill   ? acetaminophen (TYLENOL) 500 MG tablet Take 1,000 mg by mouth every 6 (six) hours as needed for pain.     ? ascorbic acid (VITAMIN C) 500 MG tablet Take 500 mg by mouth 2 (two) times a day.     ? carvedilol (COREG) 25 MG tablet TAKE ONE TABLET BY MOUTH TWICE DAILY with meals 180 tablet 2   ? citalopram (CELEXA) 10 MG tablet Take 1 tablet (10 mg total) by mouth daily. 30 tablet 2   ? furosemide  (LASIX) 20 MG tablet Take 2 tablets (40 mg total) by mouth daily. 90 tablet 3   ? levothyroxine (SYNTHROID, LEVOTHROID) 125 MCG tablet Take 1 tablet (125 mcg total) by mouth every morning. 90 tablet 3   ? multivitamin Liqd solution Take 5 mL by mouth every morning.      ? sacubitriL-valsartan (ENTRESTO)  mg Tab tablet Take 1 tablet by mouth 2 (two) times a day. 60 tablet 0   ? XARELTO 20 mg tablet Take 1 tablet (20 mg total) by mouth daily with supper. 90 tablet 1   ? zolpidem (AMBIEN) 10 mg tablet Take 1 tablet (10 mg total) by mouth at bedtime as needed for sleep. 30 tablet 5     No current facility-administered medications for this visit.     Allergies   Allergen Reactions   ? Amiodarone Other (See Comments)     Pulmonary toxicity   ? Amoxicillin Anaphylaxis   ? Inspra [Eplerenone] Hives     Throat swelling and hives   ? Multaq [Dronedarone] Other (See Comments)     Pt had similar response to Multaq as Amiodarone, c/o SOB and difficulty breathing   ? Spironolactone      Hand swelling, back pain         Lab Results    Chemistry/lipid CBC Cardiac Enzymes/BNP/TSH/INR   Lab Results   Component Value Date    CHOL 221 (H) 07/07/2017    HDL 51 07/07/2017    LDLCALC 141 (H) 07/07/2017    TRIG 147 07/07/2017    CREATININE 1.09 07/15/2020    BUN 21 07/15/2020    K 4.6 07/15/2020     07/15/2020     07/15/2020    CO2 27 07/15/2020    Lab Results   Component Value Date    WBC 6.5 04/13/2020    HGB 15.0 04/13/2020    HCT 44.2 04/13/2020    MCV 99 04/13/2020     04/13/2020    Lab Results   Component Value Date    CKTOTAL 218 (H) 07/24/2019    TROPONINI 0.02 08/25/2014     (H) 06/26/2019    TSH 4.68 04/13/2020    INR 1.80 (!) 06/11/2014

## 2021-10-10 ENCOUNTER — HEALTH MAINTENANCE LETTER (OUTPATIENT)
Age: 81
End: 2021-10-10

## 2022-07-16 ENCOUNTER — HEALTH MAINTENANCE LETTER (OUTPATIENT)
Age: 82
End: 2022-07-16

## 2022-09-18 ENCOUNTER — HEALTH MAINTENANCE LETTER (OUTPATIENT)
Age: 82
End: 2022-09-18

## 2023-07-30 ENCOUNTER — HEALTH MAINTENANCE LETTER (OUTPATIENT)
Age: 83
End: 2023-07-30